# Patient Record
Sex: FEMALE | Race: WHITE | NOT HISPANIC OR LATINO | Employment: OTHER | URBAN - METROPOLITAN AREA
[De-identification: names, ages, dates, MRNs, and addresses within clinical notes are randomized per-mention and may not be internally consistent; named-entity substitution may affect disease eponyms.]

---

## 2018-11-22 ENCOUNTER — HOSPITAL ENCOUNTER (EMERGENCY)
Facility: HOSPITAL | Age: 83
Discharge: HOME | End: 2018-11-22
Attending: EMERGENCY MEDICINE | Admitting: EMERGENCY MEDICINE
Payer: MEDICARE

## 2018-11-22 ENCOUNTER — APPOINTMENT (EMERGENCY)
Dept: RADIOLOGY | Facility: HOSPITAL | Age: 83
End: 2018-11-22
Payer: MEDICARE

## 2018-11-22 ENCOUNTER — APPOINTMENT (EMERGENCY)
Dept: CT IMAGING | Facility: HOSPITAL | Age: 83
End: 2018-11-22
Payer: MEDICARE

## 2018-11-22 VITALS
BODY MASS INDEX: 25.71 KG/M2 | HEART RATE: 62 BPM | WEIGHT: 160 LBS | TEMPERATURE: 97.6 F | RESPIRATION RATE: 18 BRPM | SYSTOLIC BLOOD PRESSURE: 104 MMHG | HEIGHT: 66 IN | OXYGEN SATURATION: 98 % | DIASTOLIC BLOOD PRESSURE: 64 MMHG

## 2018-11-22 DIAGNOSIS — S70.01XA CONTUSION OF RIGHT HIP, INITIAL ENCOUNTER: ICD-10-CM
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
## 2018-11-22 LAB
ALBUMIN SERPL BCP-MCNC: 4.1 G/DL (ref 3.5–5.7)
ALP SERPL-CCNC: 96 U/L (ref 55–165)
ALT SERPL W P-5'-P-CCNC: 12 U/L (ref 7–52)
ANION GAP SERPL CALCULATED.3IONS-SCNC: 6 MMOL/L (ref 4–13)
APTT PPP: 36 SECONDS (ref 26–38)
AST SERPL W P-5'-P-CCNC: 17 U/L (ref 13–39)
ATRIAL RATE: 64 BPM
BASOPHILS # BLD AUTO: 0 THOUSANDS/ΜL (ref 0–0.1)
BASOPHILS NFR BLD AUTO: 0 % (ref 0–1)
BILIRUB SERPL-MCNC: 0.4 MG/DL (ref 0.2–1)
BUN SERPL-MCNC: 18 MG/DL (ref 7–25)
CALCIUM SERPL-MCNC: 9.6 MG/DL (ref 8.6–10.5)
CHLORIDE SERPL-SCNC: 99 MMOL/L (ref 98–107)
CO2 SERPL-SCNC: 32 MMOL/L (ref 21–31)
CREAT SERPL-MCNC: 0.82 MG/DL (ref 0.6–1.2)
EOSINOPHIL # BLD AUTO: 0.1 THOUSAND/ΜL (ref 0–0.61)
EOSINOPHIL NFR BLD AUTO: 2 % (ref 0–6)
ERYTHROCYTE [DISTWIDTH] IN BLOOD BY AUTOMATED COUNT: 13.2 % (ref 11.6–15.1)
GFR SERPL CREATININE-BSD FRML MDRD: 65 ML/MIN/1.73SQ M
GLUCOSE SERPL-MCNC: 98 MG/DL (ref 65–140)
HCT VFR BLD AUTO: 38 % (ref 37–47)
HGB BLD-MCNC: 12.7 G/DL (ref 11.5–15.4)
HOLD SPECIMEN: NORMAL
INR PPP: 1.11 (ref 0.9–1.5)
LYMPHOCYTES # BLD AUTO: 2 THOUSANDS/ΜL (ref 0.6–4.47)
LYMPHOCYTES NFR BLD AUTO: 25 % (ref 14–44)
MCH RBC QN AUTO: 32.2 PG (ref 26.8–34.3)
MCHC RBC AUTO-ENTMCNC: 33.3 G/DL (ref 31.4–37.4)
MCV RBC AUTO: 97 FL (ref 82–98)
MONOCYTES # BLD AUTO: 0.7 THOUSAND/ΜL (ref 0.17–1.22)
MONOCYTES NFR BLD AUTO: 8 % (ref 4–12)
NEUTROPHILS # BLD AUTO: 5.3 THOUSANDS/ΜL (ref 1.85–7.62)
NEUTS SEG NFR BLD AUTO: 65 % (ref 43–75)
NRBC BLD AUTO-RTO: 0 /100 WBCS
P AXIS: -24 DEGREES
PLATELET # BLD AUTO: 193 THOUSANDS/UL (ref 149–390)
PMV BLD AUTO: 8 FL (ref 8.9–12.7)
POTASSIUM SERPL-SCNC: 3.9 MMOL/L (ref 3.5–5.5)
PR INTERVAL: 228 MS
PROT SERPL-MCNC: 6.7 G/DL (ref 6.4–8.9)
PROTHROMBIN TIME: 12.8 SECONDS (ref 10.2–13)
QRS AXIS: -25 DEGREES
QRSD INTERVAL: 86 MS
QT INTERVAL: 440 MS
QTC INTERVAL: 453 MS
RBC # BLD AUTO: 3.94 MILLION/UL (ref 3.81–5.12)
SODIUM SERPL-SCNC: 137 MMOL/L (ref 134–143)
T WAVE AXIS: 21 DEGREES
VENTRICULAR RATE: 64 BPM
WBC # BLD AUTO: 8.2 THOUSAND/UL (ref 4.31–10.16)

## 2018-11-22 PROCEDURE — 85610 PROTHROMBIN TIME: CPT | Performed by: EMERGENCY MEDICINE

## 2018-11-22 PROCEDURE — 99284 EMERGENCY DEPT VISIT MOD MDM: CPT

## 2018-11-22 PROCEDURE — 72125 CT NECK SPINE W/O DYE: CPT

## 2018-11-22 PROCEDURE — 80053 COMPREHEN METABOLIC PANEL: CPT | Performed by: EMERGENCY MEDICINE

## 2018-11-22 PROCEDURE — 85730 THROMBOPLASTIN TIME PARTIAL: CPT | Performed by: EMERGENCY MEDICINE

## 2018-11-22 PROCEDURE — 70450 CT HEAD/BRAIN W/O DYE: CPT

## 2018-11-22 PROCEDURE — 73552 X-RAY EXAM OF FEMUR 2/>: CPT

## 2018-11-22 PROCEDURE — 73521 X-RAY EXAM HIPS BI 2 VIEWS: CPT

## 2018-11-22 PROCEDURE — 93005 ELECTROCARDIOGRAM TRACING: CPT

## 2018-11-22 PROCEDURE — 85025 COMPLETE CBC W/AUTO DIFF WBC: CPT | Performed by: EMERGENCY MEDICINE

## 2018-11-22 PROCEDURE — 36415 COLL VENOUS BLD VENIPUNCTURE: CPT | Performed by: EMERGENCY MEDICINE

## 2018-11-22 PROCEDURE — 93010 ELECTROCARDIOGRAM REPORT: CPT | Performed by: INTERNAL MEDICINE

## 2018-11-22 RX ORDER — LANOLIN ALCOHOL/MO/W.PET/CERES
CREAM (GRAM) TOPICAL DAILY
COMMUNITY
End: 2019-11-25 | Stop reason: HOSPADM

## 2018-11-22 RX ORDER — FERROUS SULFATE 325(65) MG
325 TABLET ORAL 2 TIMES DAILY WITH MEALS
Status: ON HOLD | COMMUNITY
End: 2019-06-01 | Stop reason: SDUPTHER

## 2018-11-22 RX ORDER — SPIRONOLACTONE 25 MG/1
25 TABLET ORAL DAILY
COMMUNITY
End: 2019-06-03 | Stop reason: HOSPADM

## 2018-11-22 RX ORDER — MELATONIN
50000 DAILY
COMMUNITY
End: 2019-06-03 | Stop reason: HOSPADM

## 2018-11-22 RX ORDER — BUMETANIDE 0.5 MG/1
0.5 TABLET ORAL 2 TIMES DAILY
COMMUNITY
End: 2019-06-03 | Stop reason: HOSPADM

## 2018-11-22 NOTE — ED PROVIDER NOTES
History  Chief Complaint   Patient presents with    Fall     According to EMS, the patient had fallen landing on the carpet with an abrasion to right elbow and offered c/o right hip pain  No LOC report  55-year-old female history of dementia, anemia, CAD, COPD, diverticulosis and Parkinson's disease presents for evaluation status post fall  Per EMS and nursing facility patient with unwitnessed fall just prior to ED arrival   Patient complained of right hip pain per nursing home record however EMS reports patient with complaint of left hip pain on their arrival   Patient is a very poor historian and is providing very little history  Patient currently reports no symptoms  Patient does report hitting head upon falling  History provided by:  Patient and EMS personnel  History limited by:  Dementia   used: No        Prior to Admission Medications   Prescriptions Last Dose Informant Patient Reported? Taking? bumetanide (BUMEX) 0 5 MG tablet   Yes Yes   Sig: Take 0 5 mg by mouth daily   carbidopa-levodopa (SINEMET)  mg per tablet   Yes Yes   Sig: Take 1 tablet by mouth 3 (three) times a day   cholecalciferol (VITAMIN D3) 1,000 units tablet   Yes Yes   Sig: Take 50,000 Units by mouth daily   cyanocobalamin (VITAMIN B-12) 1,000 mcg tablet   Yes Yes   Sig: Take by mouth daily   ferrous sulfate 325 (65 Fe) mg tablet   Yes Yes   Sig: Take 325 mg by mouth daily with breakfast   spironolactone (ALDACTONE) 25 mg tablet   Yes Yes   Sig: Take 25 mg by mouth daily      Facility-Administered Medications: None       Past Medical History:   Diagnosis Date    Anemia     CAD (coronary artery disease)     [facility]     Dementia     Diverticulosis     Edema     Gout     Parkinson disease ([facility] [address] )        History reviewed  No pertinent surgical history  History reviewed  No pertinent family history    I have reviewed and agree with the history as documented  Social History   Substance Use Topics    Smoking status: Not on file    Smokeless tobacco: Never Used    Alcohol use No        Review of Systems   Constitutional: Negative for chills and fever  HENT: Negative for rhinorrhea and sore throat  Eyes: Negative for visual disturbance  Respiratory: Negative for cough and shortness of breath  Cardiovascular: Negative for chest pain and leg swelling  Gastrointestinal: Negative for abdominal pain, diarrhea, nausea and vomiting  Genitourinary: Negative for dysuria  Musculoskeletal: Negative for back pain and myalgias  Hip pain   Skin: Negative for rash  Neurological: Negative for dizziness and headaches  Psychiatric/Behavioral: Negative for confusion  All other systems reviewed and are negative  Physical Exam  Physical Exam   Constitutional: She appears well-developed and well-nourished  HENT:   Nose: Nose normal    Mouth/Throat: Oropharynx is clear and moist  No oropharyngeal exudate  Eyes: Pupils are equal, round, and reactive to light  Conjunctivae and EOM are normal  No scleral icterus  Neck: Normal range of motion  Neck supple  No JVD present  No tracheal deviation present  Cardiovascular: Normal rate, regular rhythm and normal heart sounds  No murmur heard  Pulmonary/Chest: Effort normal and breath sounds normal  No respiratory distress  She has no wheezes  She has no rales  Abdominal: Soft  Bowel sounds are normal  There is no tenderness  There is no guarding  Musculoskeletal: She exhibits no edema or tenderness  Very mild shortening and external rotation of the right lower extremity   Neurological: She is alert  No cranial nerve deficit or sensory deficit  She exhibits normal muscle tone  nl sens   Skin: Skin is warm and dry  Psychiatric: She has a normal mood and affect  Her behavior is normal    Nursing note and vitals reviewed        Vital Signs  ED Triage Vitals [11/22/18 0659]   Temperature Pulse Respirations Blood Pressure SpO2   97 5 °F (36 4 °C) 62 18 104/50 97 %      Temp Source Heart Rate Source Patient Position - Orthostatic VS BP Location FiO2 (%)   Tympanic Monitor Lying Right arm --      Pain Score       --           Vitals:    11/22/18 0659 11/22/18 0943   BP: 104/50 104/64   Pulse: 62 62   Patient Position - Orthostatic VS: Lying Lying       Visual Acuity      ED Medications  Medications - No data to display    Diagnostic Studies  Results Reviewed     Procedure Component Value Units Date/Time    Comprehensive metabolic panel [[id number]]  (Abnormal) Collected:  11/22/18 0721    Lab Status:  Final result Specimen:  Blood from Arm, Left Updated:  11/22/18 0758     Sodium 137 mmol/L      Potassium 3 9 mmol/L      Chloride 99 mmol/L      CO2 32 (H) mmol/L      ANION GAP 6 mmol/L      BUN 18 mg/dL      Creatinine 0 82 mg/dL      Glucose 98 mg/dL      Calcium 9 6 mg/dL      AST 17 U/L      ALT 12 U/L      Alkaline Phosphatase 96 U/L      Total Protein 6 7 g/dL      Albumin 4 1 g/dL      Total Bilirubin 0 40 mg/dL      eGFR 65 ml/min/1 73sq m     Narrative:         National Kidney Disease Education Program recommendations are as follows:  GFR calculation is accurate only with a steady state creatinine  Chronic Kidney disease less than 60 ml/min/1 73 sq  meters  Kidney failure less than 15 ml/min/1 73 sq  meters      Michael Reynolds [[id number]]  (Normal) Collected:  11/22/18 0721    Lab Status:  Final result Specimen:  Blood from Arm, Left Updated:  11/22/18 0751     Protime 12 8 seconds      INR 1 11    APTT [[id number]]  (Normal) Collected:  11/22/18 0721    Lab Status:  Final result Specimen:  Blood from Arm, Left Updated:  11/22/18 0751     PTT 36 seconds     CBC and differential [[id number]]  (Abnormal) Collected:  11/22/18 0721    Lab Status:  Final result Specimen:  Blood from Arm, Left Updated:  11/22/18 0739     WBC 8 20 Thousand/uL      RBC 3 94 Million/uL      Hemoglobin 12 7 g/dL      Hematocrit 38 0 %      MCV 97 fL      MCH 32 2 pg      MCHC 33 3 g/dL      RDW 13 2 %      MPV 8 0 (L) fL      Platelets 504 Thousands/uL      nRBC 0 /100 WBCs      Neutrophils Relative 65 %      Lymphocytes Relative 25 %      Monocytes Relative 8 %      Eosinophils Relative 2 %      Basophils Relative 0 %      Neutrophils Absolute 5 30 Thousands/µL      Lymphocytes Absolute 2 00 Thousands/µL      Monocytes Absolute 0 70 Thousand/µL      Eosinophils Absolute 0 10 Thousand/µL      Basophils Absolute 0 00 Thousands/µL     [place] draw [[id number]] Collected:  11/22/18 0723    Lab Status:  Final result Specimen:  Blood from Arm, Left Updated:  11/22/18 3798    Narrative: The following orders were created for panel order [place] draw  Procedure                               Abnormality         Status                     ---------                               -----------         ------                     Kristen Busch / Yellow tube on SVNP[[id number]]                      Final result                 Please view results for these tests on the individual orders  XR femur 2 views LEFT   Final Result by Cheryle Duverney (11/22 0829)   No evidence for acute injury  The right hip arthroplasty is intact  Signed by Cheryle Duverney      XR femur 2 views RIGHT   Final Result by Cheryle Duverney (11/22 0829)   No evidence for acute injury  The right hip arthroplasty is intact  Signed by Cheryle Duverney      CT head without contrast   Final Result by Cheryle Duverney (11/22 0827)   Unremarkable head CT  Signed by Cheryle Duverney      CT spine cervical without contrast   Final Result by Cheryle Duverney (11/22 0827)   Unremarkable head CT  Signed by Cheryle Duverney      XR hips bilateral 2 vw w pelvis if performed   Final Result by Khris Stokes (11/22 0826)   Left hip: No acute fracture or malalignment  Degenerative changes  Right hip: Intact right total hip arthroplasty    Chronic deformity at the   greater trochanter  Signed by Adriel Tee DO                 Procedures  Procedures       Phone Contacts  ED Phone Contact    ED Course  ED Course as of Nov 22 1041   u Nov 22, 2018   0703 Patient seen examined at bedside  Labs, EKG and imaging ordered  0727 Rate 64 beats per minute normal sinus rhythm no ST segment elevations or depressions normal  1st degree AV block no prior EKG for comparison ECG 12 lead   0833 Labs and imaging reviewed  Labs and no acute abnormalities  Imaging with no evidence of acute fracture dislocation of the no traumatic head or neck injury  200 Patient's daughter at bedside to pick her up discussed results of imaging and plan for discharge back to nursing facility                                MDM  CritCare Time    Disposition  Final diagnoses:   Fall, initial encounter   Contusion of right hip, initial encounter     Time reflects when diagnosis was documented in both MDM as applicable and the Disposition within this note     Time User Action Codes Description Comment    11/22/2018  8:34 AM Kwame Aguilar Add Zaria Alonso  XXXA] Fall, initial encounter     11/22/2018  8:34 AM Prince Bentley Add [[id number]] Contusion of right hip, initial encounter       ED Disposition     ED Disposition Condition Comment    Discharge  Keena Doug discharge to home/self care  Condition at discharge: Stable        Follow-up Information     Follow up With Specialties Details Why [address] Drew Iqbal, DO Internal Medicine In 2 days  [place] [address] [address] Emergency Department Emergency Medicine  As needed, If symptoms worsen [facility]  [phone]          Patient's Medications   Discharge Prescriptions    No medications on file     No discharge procedures on file      ED Provider  Electronically Signed by           Judith Mendes DO  11/22/18 [facility]

## 2018-11-22 NOTE — DISCHARGE INSTRUCTIONS
Hip Contusion   WHAT YOU NEED TO KNOW:   A hip contusion is a bruise that appears on the skin of your hip after an injury  The injury is caused by a fall, being hit with a large object, or kicked  A bruise happens when small blood vessels tear but the skin does not  When blood vessels tear, blood leaks into nearby tissue, such as soft tissue or muscle  DISCHARGE INSTRUCTIONS:   Return to the emergency department if:   · You have severe pain in your hip  · You have numbness in your leg or toes  · You cannot put any weight on or move your hip  Contact your healthcare provider if:   · Your pain does not decrease, even after treatment  · You have questions or concerns about your condition or care  Medicines:   · NSAIDs , such as ibuprofen, help decrease swelling, pain, and fever  This medicine is available with or without a doctor's order  NSAIDs can cause stomach bleeding or kidney problems in certain people  If you take blood thinner medicine, always ask if NSAIDs are safe for you  Always read the medicine label and follow directions  Do not give these medicines to children under 10months of age without direction from your child's healthcare provider  · Take your medicine as directed  Contact your healthcare provider if you think your medicine is not helping or if you have side effects  Tell him of her if you are allergic to any medicine  Keep a list of the medicines, vitamins, and herbs you take  Include the amounts, and when and why you take them  Bring the list or the pill bottles to follow-up visits  Carry your medicine list with you in case of an emergency  Follow up with your healthcare provider as directed: You may need to return within a week to recheck your injury  Write down any questions you have so you remember to ask them during your visits  Self-care:   · Rest  your injured hip so that it can heal  You may need to avoid putting any weight on your hip for at least 48 hours   Return to How Severe Are Your Spot(S)?: mild Have Your Spot(S) Been Treated In The Past?: has not been treated normal activities as directed  · Ice  the injury for 20 minutes every 4 hours, or as directed  Use an ice pack, or put crushed ice in a plastic bag  Cover it with a towel to protect your skin  Ice helps prevent tissue damage and decreases swelling and pain  · Compress  your injury with an elastic bandage to reduce swelling  Ask your healthcare provider how to use an elastic bandage and how tight it should be  · Elevate  your injured hip above the level of your heart as often as you can  This will help decrease swelling and pain  If possible, prop your hip and leg on pillows or blankets to keep it elevated comfortably  Help your hip contusion heal:   · Do not massage or use heat  Heat and massage may slow healing of the area  · Do not stretch injured muscles  Ask your healthcare provider when and how you may safely stretch after your injury  · Do not drink alcohol  Alcohol may slow healing of your injury  Prevent another contusion:   · Stretch and warm up before you play sports or exercise  · Wear protective gear when you play sports  · If you begin a new physical activity, start slowly to give your body a chance to adjust   © 2017 [facility] [facility] Information is for End User's use only and may not be sold, redistributed or otherwise used for commercial purposes  All illustrations and images included in CareNotes® are the copyrighted property of A D A M , Inc  or Khai Tyson  The above information is an  only  It is not intended as medical advice for individual conditions or treatments  Talk to your doctor, nurse or pharmacist before following any medical regimen to see if it is safe and effective for you  Hpi Title: Evaluation of Skin Lesions

## 2019-03-15 ENCOUNTER — APPOINTMENT (EMERGENCY)
Dept: RADIOLOGY | Facility: HOSPITAL | Age: 84
End: 2019-03-15
Payer: MEDICARE

## 2019-03-15 ENCOUNTER — APPOINTMENT (EMERGENCY)
Dept: CT IMAGING | Facility: HOSPITAL | Age: 84
End: 2019-03-15
Payer: MEDICARE

## 2019-03-15 ENCOUNTER — HOSPITAL ENCOUNTER (EMERGENCY)
Facility: HOSPITAL | Age: 84
Discharge: HOME | End: 2019-03-16
Attending: EMERGENCY MEDICINE
Payer: MEDICARE

## 2019-03-15 VITALS
HEART RATE: 76 BPM | RESPIRATION RATE: 16 BRPM | OXYGEN SATURATION: 98 % | TEMPERATURE: 97.1 F | DIASTOLIC BLOOD PRESSURE: 67 MMHG | SYSTOLIC BLOOD PRESSURE: 106 MMHG
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
## 2019-03-15 LAB — GLUCOSE SERPL-MCNC: 93 MG/DL (ref 65–140)

## 2019-03-15 PROCEDURE — 73521 X-RAY EXAM HIPS BI 2 VIEWS: CPT

## 2019-03-15 PROCEDURE — 72125 CT NECK SPINE W/O DYE: CPT

## 2019-03-15 PROCEDURE — 99284 EMERGENCY DEPT VISIT MOD MDM: CPT

## 2019-03-15 PROCEDURE — 70450 CT HEAD/BRAIN W/O DYE: CPT

## 2019-03-15 PROCEDURE — 71045 X-RAY EXAM CHEST 1 VIEW: CPT

## 2019-03-15 PROCEDURE — 82948 REAGENT STRIP/BLOOD GLUCOSE: CPT

## 2019-03-15 RX ORDER — ACETAMINOPHEN 325 MG/1
650 TABLET ORAL EVERY 4 HOURS PRN
COMMUNITY

## 2019-03-15 RX ORDER — LOPERAMIDE HYDROCHLORIDE 2 MG/1
2 CAPSULE ORAL 4 TIMES DAILY PRN
COMMUNITY
End: 2019-11-25 | Stop reason: HOSPADM

## 2019-03-15 RX ORDER — TRIAMCINOLONE ACETONIDE 1 MG/G
CREAM TOPICAL 2 TIMES DAILY
COMMUNITY

## 2019-03-15 RX ORDER — SIMETHICONE 80 MG
80 TABLET,CHEWABLE ORAL EVERY 6 HOURS PRN
COMMUNITY

## 2019-03-15 RX ORDER — NYSTATIN 100000 U/G
CREAM TOPICAL 2 TIMES DAILY
COMMUNITY

## 2019-03-16 NOTE — ED PROVIDER NOTES
History  Chief Complaint   Patient presents with    Fall     fell at [facility] care home onto [address]  denies pain or injury at this time  history of dementia     Patient is an 27-year-old female with history of dementia presents the emergency department from local personal care [place] after a unwitnessed fall patient was found on the ground she denies any acute injuries or complaints there is no evidence of external trauma  Patient does have a history of dementia but is behaving normally and appropriately per family members present with the patient in the ED  History provided by:  Patient and EMS personnel  Fall   Mechanism of injury: fall    Injury location:  Head/neck  Time since incident:  1 hour  Arrived directly from scene: yes    Suspicion of alcohol use: no    Suspicion of drug use: no    Prior to arrival data:     Responsiveness at scene:  Alert    Loss of consciousness: no      Amnesic to event: no    Associated symptoms: no abdominal pain, no chest pain, no headaches, no nausea and no vomiting        Prior to Admission Medications   Prescriptions Last Dose Informant Patient Reported?  Taking?   acetaminophen (TYLENOL) 325 mg tablet   Yes Yes   Sig: Take 650 mg by mouth every 6 (six) hours as needed for mild pain   bumetanide (BUMEX) 0 5 MG tablet   Yes Yes   Sig: Take 0 5 mg by mouth daily   carbidopa-levodopa (SINEMET)  mg per tablet   Yes Yes   Sig: Take 1 tablet by mouth 3 (three) times a day   cholecalciferol (VITAMIN D3) 1,000 units tablet   Yes Yes   Sig: Take 50,000 Units by mouth daily   cyanocobalamin (VITAMIN B-12) 1,000 mcg tablet   Yes Yes   Sig: Take by mouth daily   ferrous sulfate 325 (65 Fe) mg tablet   Yes Yes   Sig: Take 325 mg by mouth daily with breakfast   loperamide (IMODIUM) 2 mg capsule   Yes Yes   Sig: Take 2 mg by mouth 4 (four) times a day as needed for diarrhea   nystatin (MYCOSTATIN) cream   Yes Yes   Sig: Apply topically 2 (two) times a day   simethicone (MYLICON) 80 mg chewable tablet   Yes Yes   Sig: Chew 80 mg every 6 (six) hours as needed for flatulence   spironolactone (ALDACTONE) 25 mg tablet   Yes Yes   Sig: Take 25 mg by mouth daily   triamcinolone (KENALOG) 0 1 % cream   Yes Yes   Sig: Apply topically 2 (two) times a day      Facility-Administered Medications: None       Past Medical History:   Diagnosis Date    Anemia     CAD (coronary artery disease)     COPD (chronic obstructive pulmonary disease) ([place] [address] )     Dementia     Diverticulosis     Edema     Gout     Parkinson disease ([place] [address] )        History reviewed  No pertinent surgical history  History reviewed  No pertinent family history  I have reviewed and agree with the history as documented  Social History     Tobacco Use    Smoking status: Never Smoker    Smokeless tobacco: Never Used   Substance Use Topics    Alcohol use: No    Drug use: No        Review of Systems   Constitutional: Negative for activity change, appetite change, chills, fatigue and fever  HENT: Negative for congestion, ear pain, rhinorrhea and sore throat  Eyes: Negative for discharge, redness and visual disturbance  Respiratory: Negative for cough, chest tightness, shortness of breath and wheezing  Cardiovascular: Negative for chest pain and palpitations  Gastrointestinal: Negative for abdominal pain, constipation, diarrhea, nausea and vomiting  Endocrine: Negative for polydipsia and polyuria  Genitourinary: Negative for difficulty urinating, dysuria, frequency, hematuria and urgency  Musculoskeletal: Negative for arthralgias and myalgias  Skin: Negative for color change, pallor and rash  Neurological: Negative for dizziness, weakness, light-headedness, numbness and headaches  Hematological: Negative for adenopathy  Does not bruise/bleed easily  All other systems reviewed and are negative  Physical Exam  Physical Exam   Constitutional: She appears well-developed and well-nourished  HENT:   Head: Normocephalic and atraumatic  Right Ear: External ear normal    Left Ear: External ear normal    Nose: Nose normal    Mouth/Throat: Oropharynx is clear and moist    Eyes: Pupils are equal, round, and reactive to light  Conjunctivae and EOM are normal    Neck: Normal range of motion  Neck supple  Cardiovascular: Normal rate, regular rhythm, normal heart sounds and intact distal pulses  Pulmonary/Chest: Effort normal and breath sounds normal  No respiratory distress  She has no wheezes  She has no rales  She exhibits no tenderness  Abdominal: Soft  Bowel sounds are normal  She exhibits no distension  There is no tenderness  There is no guarding  Musculoskeletal: Normal range of motion  Neurological: She is alert  No cranial nerve deficit or sensory deficit  Skin: Skin is warm and dry  Psychiatric: She has a normal mood and affect  Nursing note and vitals reviewed        Vital Signs  ED Triage Vitals [03/15/19 2252]   Temperature Pulse Respirations Blood Pressure SpO2   (!) 97 1 °F (36 2 °C) 73 16 113/62 99 %      Temp Source Heart Rate Source Patient Position - Orthostatic VS BP Location FiO2 (%)   Temporal -- Lying Right arm --      Pain Score       No Pain           Vitals:    03/15/19 2252 03/15/19 2300   BP: 113/62 106/67   Pulse: 73 76   Patient Position - Orthostatic VS: Lying        qSOFA     Row Name 03/15/19 2310 03/15/19 2300 03/15/19 2252          Altered mental status GCS < 15  1  --  --      Respiratory Rate > / =22  --  --  0      Systolic BP < / =914  --  0  0      Q Sofa Score  1  0  0            Visual Acuity      ED Medications  Medications - No data to display    Diagnostic Studies  Results Reviewed     Procedure Component Value Units Date/Time    Fingerstick Glucose (POCT) [[id number]]  (Normal) Collected:  03/15/19 2302    Lab Status:  Final result Updated:  03/15/19 2303     POC Glucose 93 mg/dl                  XR hips bilateral with ap pelvis 2 vw   ED Interpretation by Efra Barreto DO (03/16 0020)   No acute fracture or dislocation      XR chest 1 view portable   ED Interpretation by Efra Barreto DO (03/16 0020)   No acute cardiopulmonary abnormality      CT spine cervical without contrast   Final Result by Anahy Hernandez MD (03/16 0031)   Images motion affected limiting evaluation for a nondisplaced  Consider repeat exam with the patient's mental status permits  No gross or displaced cervical spine fracture or traumatic malalignment  Workstation performed: [id number]         CT head without contrast   Final Result by Anahy Hernandez MD (03/16 0025)      No acute intracranial abnormality  Workstation performed: [id number]                    Procedures  Procedures       Phone Contacts  ED Phone Contact    ED Course  ED Course as of Mar 16 0037   Sat Mar 16, 2019   0018 XR hips bilateral with ap pelvis 2 vw                               MDM  Number of Diagnoses or Management Options  Diagnosis management comments: No evidence of acute traumatic injury patient remained clinically and hemodynamically stable in the emergency department without any complaints advised on fall precautions and advised prompt follow-up with primary physician for re-evaluation and obtain test results return precautions and anticipatory guidance discussed  Amount and/or Complexity of Data Reviewed  Tests in the radiology section of CPT®: ordered and reviewed  Independent visualization of images, tracings, or specimens: yes    Risk of Complications, Morbidity, and/or Mortality  Presenting problems: moderate  Management options: moderate    Patient Progress  Patient progress: stable      Disposition  Final diagnoses:   Fall, initial encounter     Time reflects when diagnosis was documented in both MDM as applicable and the Disposition within this note     Time User Action Codes Description Comment    3/16/2019 12:26 AM Aditi Fuller Fall, initial encounter       ED Disposition     ED Disposition Condition Date/Time Comment    Discharge Stable Sat Mar 16, 2019 12:26 AM Skeet [place] discharge to home/self care  Follow-up Information     Follow up With Specialties Details Why Contact Info    Alejandro Ugarte DO Internal Medicine Schedule an appointment as soon as possible for a visit in 3 days  [facility] [address]  [phone]            Patient's Medications   Discharge Prescriptions    No medications on file     No discharge procedures on file      ED Provider  Electronically Signed by           Yin Brady DO  03/16/19 6449

## 2019-05-28 ENCOUNTER — APPOINTMENT (EMERGENCY)
Dept: RADIOLOGY | Facility: HOSPITAL | Age: 84
DRG: 071 | End: 2019-05-28
Payer: MEDICARE

## 2019-05-28 ENCOUNTER — APPOINTMENT (EMERGENCY)
Dept: CT IMAGING | Facility: HOSPITAL | Age: 84
DRG: 071 | End: 2019-05-28
Payer: MEDICARE

## 2019-05-28 ENCOUNTER — HOSPITAL ENCOUNTER (INPATIENT)
Facility: HOSPITAL | Age: 84
LOS: 5 days | Discharge: NURSING, REHAB OR LONG-TERM CARE | DRG: 071 | End: 2019-06-03
Attending: EMERGENCY MEDICINE | Admitting: FAMILY MEDICINE
Payer: MEDICARE

## 2019-05-28 DIAGNOSIS — F03.90 DEMENTIA (HCC): ICD-10-CM

## 2019-05-28 DIAGNOSIS — L03.115 CELLULITIS OF RIGHT LEG: Primary | ICD-10-CM

## 2019-05-28 DIAGNOSIS — I50.9 CHF EXACERBATION (HCC): ICD-10-CM

## 2019-05-28 DIAGNOSIS — L03.115 CELLULITIS OF RIGHT LOWER EXTREMITY: ICD-10-CM

## 2019-05-28 DIAGNOSIS — M79.89 LEG SWELLING: ICD-10-CM

## 2019-05-28 DIAGNOSIS — N30.00 ACUTE CYSTITIS WITHOUT HEMATURIA: ICD-10-CM

## 2019-05-28 DIAGNOSIS — E61.1 IRON DEFICIENCY: ICD-10-CM

## 2019-05-28 PROBLEM — R60.0 BILATERAL LOWER EXTREMITY EDEMA: Status: ACTIVE | Noted: 2019-05-28

## 2019-05-28 LAB
ABO GROUP BLD: NORMAL
ALBUMIN SERPL BCP-MCNC: 3.5 G/DL (ref 3.5–5)
ALP SERPL-CCNC: 144 U/L (ref 46–116)
ALT SERPL W P-5'-P-CCNC: 9 U/L (ref 12–78)
ANION GAP SERPL CALCULATED.3IONS-SCNC: 10 MMOL/L (ref 4–13)
APTT PPP: 32 SECONDS (ref 26–38)
AST SERPL W P-5'-P-CCNC: 20 U/L (ref 5–45)
BACTERIA UR QL AUTO: ABNORMAL /HPF
BASOPHILS # BLD AUTO: 0.03 THOUSANDS/ΜL (ref 0–0.1)
BASOPHILS NFR BLD AUTO: 0 % (ref 0–1)
BILIRUB DIRECT SERPL-MCNC: 0.05 MG/DL (ref 0–0.2)
BILIRUB SERPL-MCNC: 0.3 MG/DL (ref 0.2–1)
BILIRUB UR QL STRIP: NEGATIVE
BLD GP AB SCN SERPL QL: POSITIVE
BLOOD GROUP ANTIBODIES SERPL: NORMAL
BUN SERPL-MCNC: 27 MG/DL (ref 5–25)
CALCIUM SERPL-MCNC: 9.8 MG/DL (ref 8.3–10.1)
CHLORIDE SERPL-SCNC: 99 MMOL/L (ref 100–108)
CLARITY UR: ABNORMAL
CO2 SERPL-SCNC: 31 MMOL/L (ref 21–32)
COLOR UR: YELLOW
CREAT SERPL-MCNC: 0.9 MG/DL (ref 0.6–1.3)
EOSINOPHIL # BLD AUTO: 0.09 THOUSAND/ΜL (ref 0–0.61)
EOSINOPHIL NFR BLD AUTO: 1 % (ref 0–6)
ERYTHROCYTE [DISTWIDTH] IN BLOOD BY AUTOMATED COUNT: 13.4 % (ref 11.6–15.1)
GFR SERPL CREATININE-BSD FRML MDRD: 58 ML/MIN/1.73SQ M
GLUCOSE SERPL-MCNC: 107 MG/DL (ref 65–140)
GLUCOSE UR STRIP-MCNC: NEGATIVE MG/DL
HCT VFR BLD AUTO: 33.7 % (ref 34.8–46.1)
HGB BLD-MCNC: 10.8 G/DL (ref 11.5–15.4)
HGB UR QL STRIP.AUTO: NEGATIVE
IMM GRANULOCYTES # BLD AUTO: 0.04 THOUSAND/UL (ref 0–0.2)
IMM GRANULOCYTES NFR BLD AUTO: 0 % (ref 0–2)
INR PPP: 1.05 (ref 0.86–1.17)
KETONES UR STRIP-MCNC: NEGATIVE MG/DL
LACTATE SERPL-SCNC: 0.7 MMOL/L (ref 0.5–2)
LEUKOCYTE ESTERASE UR QL STRIP: ABNORMAL
LYMPHOCYTES # BLD AUTO: 2.17 THOUSANDS/ΜL (ref 0.6–4.47)
LYMPHOCYTES NFR BLD AUTO: 19 % (ref 14–44)
MCH RBC QN AUTO: 31 PG (ref 26.8–34.3)
MCHC RBC AUTO-ENTMCNC: 32 G/DL (ref 31.4–37.4)
MCV RBC AUTO: 97 FL (ref 82–98)
MONOCYTES # BLD AUTO: 0.85 THOUSAND/ΜL (ref 0.17–1.22)
MONOCYTES NFR BLD AUTO: 8 % (ref 4–12)
NEUTROPHILS # BLD AUTO: 8.21 THOUSANDS/ΜL (ref 1.85–7.62)
NEUTS SEG NFR BLD AUTO: 72 % (ref 43–75)
NITRITE UR QL STRIP: POSITIVE
NON-SQ EPI CELLS URNS QL MICRO: ABNORMAL /HPF
NRBC BLD AUTO-RTO: 0 /100 WBCS
NT-PROBNP SERPL-MCNC: 215 PG/ML
PH UR STRIP.AUTO: 5.5 [PH]
PLATELET # BLD AUTO: 250 THOUSANDS/UL (ref 149–390)
PLATELET # BLD AUTO: 270 THOUSANDS/UL (ref 149–390)
PMV BLD AUTO: 9.5 FL (ref 8.9–12.7)
PMV BLD AUTO: 9.7 FL (ref 8.9–12.7)
POTASSIUM SERPL-SCNC: 4.2 MMOL/L (ref 3.5–5.3)
PROT SERPL-MCNC: 7.8 G/DL (ref 6.4–8.2)
PROT UR STRIP-MCNC: NEGATIVE MG/DL
PROTHROMBIN TIME: 13.6 SECONDS (ref 11.8–14.2)
RBC # BLD AUTO: 3.48 MILLION/UL (ref 3.81–5.12)
RBC #/AREA URNS AUTO: ABNORMAL /HPF
RH BLD: POSITIVE
SODIUM SERPL-SCNC: 140 MMOL/L (ref 136–145)
SP GR UR STRIP.AUTO: 1.01 (ref 1–1.03)
SPECIMEN EXPIRATION DATE: NORMAL
TROPONIN I SERPL-MCNC: <0.02 NG/ML
TSH SERPL DL<=0.05 MIU/L-ACNC: 1.75 UIU/ML (ref 0.36–3.74)
UROBILINOGEN UR QL STRIP.AUTO: 0.2 E.U./DL
WBC # BLD AUTO: 11.39 THOUSAND/UL (ref 4.31–10.16)
WBC #/AREA URNS AUTO: ABNORMAL /HPF

## 2019-05-28 PROCEDURE — 87077 CULTURE AEROBIC IDENTIFY: CPT | Performed by: INTERNAL MEDICINE

## 2019-05-28 PROCEDURE — 84443 ASSAY THYROID STIM HORMONE: CPT | Performed by: EMERGENCY MEDICINE

## 2019-05-28 PROCEDURE — 99285 EMERGENCY DEPT VISIT HI MDM: CPT

## 2019-05-28 PROCEDURE — 1124F ACP DISCUSS-NO DSCNMKR DOCD: CPT | Performed by: INTERNAL MEDICINE

## 2019-05-28 PROCEDURE — 80048 BASIC METABOLIC PNL TOTAL CA: CPT | Performed by: EMERGENCY MEDICINE

## 2019-05-28 PROCEDURE — 71046 X-RAY EXAM CHEST 2 VIEWS: CPT

## 2019-05-28 PROCEDURE — 86900 BLOOD TYPING SEROLOGIC ABO: CPT | Performed by: EMERGENCY MEDICINE

## 2019-05-28 PROCEDURE — 86850 RBC ANTIBODY SCREEN: CPT | Performed by: EMERGENCY MEDICINE

## 2019-05-28 PROCEDURE — 87186 SC STD MICRODIL/AGAR DIL: CPT | Performed by: INTERNAL MEDICINE

## 2019-05-28 PROCEDURE — 99219 PR INITIAL OBSERVATION CARE/DAY 50 MINUTES: CPT | Performed by: INTERNAL MEDICINE

## 2019-05-28 PROCEDURE — 86870 RBC ANTIBODY IDENTIFICATION: CPT | Performed by: EMERGENCY MEDICINE

## 2019-05-28 PROCEDURE — 36415 COLL VENOUS BLD VENIPUNCTURE: CPT | Performed by: EMERGENCY MEDICINE

## 2019-05-28 PROCEDURE — 80076 HEPATIC FUNCTION PANEL: CPT | Performed by: EMERGENCY MEDICINE

## 2019-05-28 PROCEDURE — 85610 PROTHROMBIN TIME: CPT | Performed by: EMERGENCY MEDICINE

## 2019-05-28 PROCEDURE — 87086 URINE CULTURE/COLONY COUNT: CPT | Performed by: INTERNAL MEDICINE

## 2019-05-28 PROCEDURE — 85730 THROMBOPLASTIN TIME PARTIAL: CPT | Performed by: EMERGENCY MEDICINE

## 2019-05-28 PROCEDURE — 84484 ASSAY OF TROPONIN QUANT: CPT | Performed by: EMERGENCY MEDICINE

## 2019-05-28 PROCEDURE — 70450 CT HEAD/BRAIN W/O DYE: CPT

## 2019-05-28 PROCEDURE — 85049 AUTOMATED PLATELET COUNT: CPT | Performed by: INTERNAL MEDICINE

## 2019-05-28 PROCEDURE — 83880 ASSAY OF NATRIURETIC PEPTIDE: CPT | Performed by: EMERGENCY MEDICINE

## 2019-05-28 PROCEDURE — 83605 ASSAY OF LACTIC ACID: CPT | Performed by: EMERGENCY MEDICINE

## 2019-05-28 PROCEDURE — 86901 BLOOD TYPING SEROLOGIC RH(D): CPT | Performed by: EMERGENCY MEDICINE

## 2019-05-28 PROCEDURE — 87040 BLOOD CULTURE FOR BACTERIA: CPT | Performed by: EMERGENCY MEDICINE

## 2019-05-28 PROCEDURE — 81001 URINALYSIS AUTO W/SCOPE: CPT | Performed by: INTERNAL MEDICINE

## 2019-05-28 PROCEDURE — 93005 ELECTROCARDIOGRAM TRACING: CPT

## 2019-05-28 PROCEDURE — 99284 EMERGENCY DEPT VISIT MOD MDM: CPT | Performed by: EMERGENCY MEDICINE

## 2019-05-28 PROCEDURE — 85025 COMPLETE CBC W/AUTO DIFF WBC: CPT | Performed by: EMERGENCY MEDICINE

## 2019-05-28 RX ORDER — ACETAMINOPHEN 500 MG
500 TABLET ORAL 2 TIMES DAILY
COMMUNITY
End: 2019-06-03 | Stop reason: HOSPADM

## 2019-05-28 RX ORDER — BUMETANIDE 0.25 MG/ML
1 INJECTION, SOLUTION INTRAMUSCULAR; INTRAVENOUS 2 TIMES DAILY
Status: DISCONTINUED | OUTPATIENT
Start: 2019-05-28 | End: 2019-05-30

## 2019-05-28 RX ORDER — CEFAZOLIN SODIUM 2 G/50ML
2000 SOLUTION INTRAVENOUS ONCE
Status: DISCONTINUED | OUTPATIENT
Start: 2019-05-28 | End: 2019-05-28 | Stop reason: SDUPTHER

## 2019-05-28 RX ORDER — CEFAZOLIN SODIUM 2 G/50ML
2000 SOLUTION INTRAVENOUS EVERY 8 HOURS
Status: DISCONTINUED | OUTPATIENT
Start: 2019-05-28 | End: 2019-05-31

## 2019-05-28 RX ORDER — HEPARIN SODIUM 5000 [USP'U]/ML
5000 INJECTION, SOLUTION INTRAVENOUS; SUBCUTANEOUS EVERY 8 HOURS SCHEDULED
Status: DISCONTINUED | OUTPATIENT
Start: 2019-05-28 | End: 2019-06-03 | Stop reason: HOSPADM

## 2019-05-28 RX ORDER — FUROSEMIDE 10 MG/ML
20 INJECTION INTRAMUSCULAR; INTRAVENOUS ONCE
Status: COMPLETED | OUTPATIENT
Start: 2019-05-28 | End: 2019-05-28

## 2019-05-28 RX ORDER — SIMETHICONE 80 MG
80 TABLET,CHEWABLE ORAL EVERY 6 HOURS PRN
Status: DISCONTINUED | OUTPATIENT
Start: 2019-05-28 | End: 2019-06-03 | Stop reason: HOSPADM

## 2019-05-28 RX ORDER — ERGOCALCIFEROL 1.25 MG/1
50000 CAPSULE ORAL WEEKLY
COMMUNITY
Start: 2019-05-29 | End: 2019-11-25 | Stop reason: HOSPADM

## 2019-05-28 RX ORDER — SPIRONOLACTONE 25 MG/1
25 TABLET ORAL DAILY
Status: DISCONTINUED | OUTPATIENT
Start: 2019-05-29 | End: 2019-05-31

## 2019-05-28 RX ORDER — ACETAMINOPHEN 325 MG/1
650 TABLET ORAL EVERY 6 HOURS PRN
Status: DISCONTINUED | OUTPATIENT
Start: 2019-05-28 | End: 2019-06-03 | Stop reason: HOSPADM

## 2019-05-28 RX ADMIN — CEFAZOLIN SODIUM 2000 MG: 2 SOLUTION INTRAVENOUS at 21:41

## 2019-05-28 RX ADMIN — FUROSEMIDE 20 MG: 10 INJECTION, SOLUTION INTRAMUSCULAR; INTRAVENOUS at 21:41

## 2019-05-28 RX ADMIN — HEPARIN SODIUM 5000 UNITS: 5000 INJECTION INTRAVENOUS; SUBCUTANEOUS at 22:44

## 2019-05-28 RX ADMIN — CARBIDOPA AND LEVODOPA 1 TABLET: 25; 100 TABLET ORAL at 22:44

## 2019-05-29 ENCOUNTER — APPOINTMENT (OUTPATIENT)
Dept: NON INVASIVE DIAGNOSTICS | Facility: HOSPITAL | Age: 84
DRG: 071 | End: 2019-05-29
Payer: MEDICARE

## 2019-05-29 ENCOUNTER — APPOINTMENT (INPATIENT)
Dept: ULTRASOUND IMAGING | Facility: HOSPITAL | Age: 84
DRG: 071 | End: 2019-05-29
Payer: MEDICARE

## 2019-05-29 PROBLEM — F03.90 DEMENTIA ARISING IN THE SENIUM AND PRESENIUM (HCC): Status: ACTIVE | Noted: 2019-05-29

## 2019-05-29 LAB
ANION GAP SERPL CALCULATED.3IONS-SCNC: 10 MMOL/L (ref 4–13)
BASOPHILS # BLD AUTO: 0.02 THOUSANDS/ΜL (ref 0–0.1)
BASOPHILS NFR BLD AUTO: 0 % (ref 0–1)
BUN SERPL-MCNC: 22 MG/DL (ref 5–25)
CALCIUM SERPL-MCNC: 9.3 MG/DL (ref 8.3–10.1)
CHLORIDE SERPL-SCNC: 105 MMOL/L (ref 100–108)
CO2 SERPL-SCNC: 29 MMOL/L (ref 21–32)
CREAT SERPL-MCNC: 0.83 MG/DL (ref 0.6–1.3)
EOSINOPHIL # BLD AUTO: 0.09 THOUSAND/ΜL (ref 0–0.61)
EOSINOPHIL NFR BLD AUTO: 1 % (ref 0–6)
ERYTHROCYTE [DISTWIDTH] IN BLOOD BY AUTOMATED COUNT: 13.3 % (ref 11.6–15.1)
GFR SERPL CREATININE-BSD FRML MDRD: 64 ML/MIN/1.73SQ M
GLUCOSE P FAST SERPL-MCNC: 91 MG/DL (ref 65–99)
GLUCOSE SERPL-MCNC: 91 MG/DL (ref 65–140)
HCT VFR BLD AUTO: 31.7 % (ref 34.8–46.1)
HGB BLD-MCNC: 10.2 G/DL (ref 11.5–15.4)
IMM GRANULOCYTES # BLD AUTO: 0.03 THOUSAND/UL (ref 0–0.2)
IMM GRANULOCYTES NFR BLD AUTO: 0 % (ref 0–2)
LYMPHOCYTES # BLD AUTO: 2.37 THOUSANDS/ΜL (ref 0.6–4.47)
LYMPHOCYTES NFR BLD AUTO: 26 % (ref 14–44)
MAGNESIUM SERPL-MCNC: 2.2 MG/DL (ref 1.6–2.6)
MCH RBC QN AUTO: 30.8 PG (ref 26.8–34.3)
MCHC RBC AUTO-ENTMCNC: 32.2 G/DL (ref 31.4–37.4)
MCV RBC AUTO: 96 FL (ref 82–98)
MONOCYTES # BLD AUTO: 0.75 THOUSAND/ΜL (ref 0.17–1.22)
MONOCYTES NFR BLD AUTO: 8 % (ref 4–12)
NEUTROPHILS # BLD AUTO: 5.8 THOUSANDS/ΜL (ref 1.85–7.62)
NEUTS SEG NFR BLD AUTO: 65 % (ref 43–75)
NRBC BLD AUTO-RTO: 0 /100 WBCS
PLATELET # BLD AUTO: 240 THOUSANDS/UL (ref 149–390)
PMV BLD AUTO: 9.4 FL (ref 8.9–12.7)
POTASSIUM SERPL-SCNC: 3.6 MMOL/L (ref 3.5–5.3)
RBC # BLD AUTO: 3.31 MILLION/UL (ref 3.81–5.12)
SODIUM SERPL-SCNC: 144 MMOL/L (ref 136–145)
VIT B12 SERPL-MCNC: 860 PG/ML (ref 100–900)
WBC # BLD AUTO: 9.06 THOUSAND/UL (ref 4.31–10.16)

## 2019-05-29 PROCEDURE — G8988 SELF CARE GOAL STATUS: HCPCS

## 2019-05-29 PROCEDURE — 99222 1ST HOSP IP/OBS MODERATE 55: CPT | Performed by: PSYCHIATRY & NEUROLOGY

## 2019-05-29 PROCEDURE — 85025 COMPLETE CBC W/AUTO DIFF WBC: CPT | Performed by: INTERNAL MEDICINE

## 2019-05-29 PROCEDURE — 93306 TTE W/DOPPLER COMPLETE: CPT | Performed by: INTERNAL MEDICINE

## 2019-05-29 PROCEDURE — 83735 ASSAY OF MAGNESIUM: CPT | Performed by: INTERNAL MEDICINE

## 2019-05-29 PROCEDURE — 93306 TTE W/DOPPLER COMPLETE: CPT

## 2019-05-29 PROCEDURE — G8979 MOBILITY GOAL STATUS: HCPCS

## 2019-05-29 PROCEDURE — 82607 VITAMIN B-12: CPT | Performed by: FAMILY MEDICINE

## 2019-05-29 PROCEDURE — 99222 1ST HOSP IP/OBS MODERATE 55: CPT | Performed by: INTERNAL MEDICINE

## 2019-05-29 PROCEDURE — G8987 SELF CARE CURRENT STATUS: HCPCS

## 2019-05-29 PROCEDURE — 93971 EXTREMITY STUDY: CPT | Performed by: SURGERY

## 2019-05-29 PROCEDURE — 93971 EXTREMITY STUDY: CPT

## 2019-05-29 PROCEDURE — G8978 MOBILITY CURRENT STATUS: HCPCS

## 2019-05-29 PROCEDURE — 80048 BASIC METABOLIC PNL TOTAL CA: CPT | Performed by: INTERNAL MEDICINE

## 2019-05-29 PROCEDURE — 99232 SBSQ HOSP IP/OBS MODERATE 35: CPT | Performed by: FAMILY MEDICINE

## 2019-05-29 PROCEDURE — 97167 OT EVAL HIGH COMPLEX 60 MIN: CPT

## 2019-05-29 PROCEDURE — 97163 PT EVAL HIGH COMPLEX 45 MIN: CPT

## 2019-05-29 RX ORDER — OLANZAPINE 10 MG/1
2.5 INJECTION, POWDER, LYOPHILIZED, FOR SOLUTION INTRAMUSCULAR EVERY 8 HOURS PRN
Status: DISCONTINUED | OUTPATIENT
Start: 2019-05-29 | End: 2019-06-03 | Stop reason: HOSPADM

## 2019-05-29 RX ADMIN — SPIRONOLACTONE 25 MG: 25 TABLET ORAL at 09:37

## 2019-05-29 RX ADMIN — CARBIDOPA AND LEVODOPA 1 TABLET: 25; 100 TABLET ORAL at 16:36

## 2019-05-29 RX ADMIN — BUMETANIDE 1 MG: 0.25 INJECTION INTRAMUSCULAR; INTRAVENOUS at 09:37

## 2019-05-29 RX ADMIN — CARBIDOPA AND LEVODOPA 1 TABLET: 25; 100 TABLET ORAL at 09:37

## 2019-05-29 RX ADMIN — CEFAZOLIN SODIUM 2000 MG: 2 SOLUTION INTRAVENOUS at 04:42

## 2019-05-29 RX ADMIN — HEPARIN SODIUM 5000 UNITS: 5000 INJECTION INTRAVENOUS; SUBCUTANEOUS at 05:40

## 2019-05-29 RX ADMIN — CARBIDOPA AND LEVODOPA 1 TABLET: 25; 100 TABLET ORAL at 21:09

## 2019-05-29 RX ADMIN — HEPARIN SODIUM 5000 UNITS: 5000 INJECTION INTRAVENOUS; SUBCUTANEOUS at 13:39

## 2019-05-29 RX ADMIN — CEFAZOLIN SODIUM 2000 MG: 2 SOLUTION INTRAVENOUS at 12:37

## 2019-05-29 RX ADMIN — HEPARIN SODIUM 5000 UNITS: 5000 INJECTION INTRAVENOUS; SUBCUTANEOUS at 21:10

## 2019-05-29 RX ADMIN — CEFAZOLIN SODIUM 2000 MG: 2 SOLUTION INTRAVENOUS at 21:10

## 2019-05-30 PROBLEM — I50.30 DIASTOLIC CONGESTIVE HEART FAILURE (HCC): Status: ACTIVE | Noted: 2019-05-30

## 2019-05-30 PROBLEM — N30.00 ACUTE CYSTITIS: Status: ACTIVE | Noted: 2019-05-30

## 2019-05-30 LAB
ANION GAP SERPL CALCULATED.3IONS-SCNC: 8 MMOL/L (ref 4–13)
ATRIAL RATE: 69 BPM
ATRIAL RATE: 83 BPM
BASOPHILS # BLD AUTO: 0.02 THOUSANDS/ΜL (ref 0–0.1)
BASOPHILS NFR BLD AUTO: 0 % (ref 0–1)
BUN SERPL-MCNC: 17 MG/DL (ref 5–25)
CALCIUM SERPL-MCNC: 9.2 MG/DL (ref 8.3–10.1)
CHLORIDE SERPL-SCNC: 102 MMOL/L (ref 100–108)
CO2 SERPL-SCNC: 32 MMOL/L (ref 21–32)
CREAT SERPL-MCNC: 0.84 MG/DL (ref 0.6–1.3)
EOSINOPHIL # BLD AUTO: 0.13 THOUSAND/ΜL (ref 0–0.61)
EOSINOPHIL NFR BLD AUTO: 1 % (ref 0–6)
ERYTHROCYTE [DISTWIDTH] IN BLOOD BY AUTOMATED COUNT: 13.2 % (ref 11.6–15.1)
GFR SERPL CREATININE-BSD FRML MDRD: 63 ML/MIN/1.73SQ M
GLUCOSE SERPL-MCNC: 99 MG/DL (ref 65–140)
HCT VFR BLD AUTO: 32 % (ref 34.8–46.1)
HGB BLD-MCNC: 10.2 G/DL (ref 11.5–15.4)
IMM GRANULOCYTES # BLD AUTO: 0.04 THOUSAND/UL (ref 0–0.2)
IMM GRANULOCYTES NFR BLD AUTO: 0 % (ref 0–2)
LYMPHOCYTES # BLD AUTO: 2.55 THOUSANDS/ΜL (ref 0.6–4.47)
LYMPHOCYTES NFR BLD AUTO: 20 % (ref 14–44)
MCH RBC QN AUTO: 30.5 PG (ref 26.8–34.3)
MCHC RBC AUTO-ENTMCNC: 31.9 G/DL (ref 31.4–37.4)
MCV RBC AUTO: 96 FL (ref 82–98)
MONOCYTES # BLD AUTO: 1.2 THOUSAND/ΜL (ref 0.17–1.22)
MONOCYTES NFR BLD AUTO: 10 % (ref 4–12)
NEUTROPHILS # BLD AUTO: 8.55 THOUSANDS/ΜL (ref 1.85–7.62)
NEUTS SEG NFR BLD AUTO: 69 % (ref 43–75)
NRBC BLD AUTO-RTO: 0 /100 WBCS
P AXIS: 33 DEGREES
P AXIS: 4 DEGREES
PLATELET # BLD AUTO: 220 THOUSANDS/UL (ref 149–390)
PMV BLD AUTO: 9.1 FL (ref 8.9–12.7)
POTASSIUM SERPL-SCNC: 3.7 MMOL/L (ref 3.5–5.3)
PR INTERVAL: 188 MS
PR INTERVAL: 194 MS
QRS AXIS: -12 DEGREES
QRS AXIS: -26 DEGREES
QRSD INTERVAL: 84 MS
QRSD INTERVAL: 86 MS
QT INTERVAL: 408 MS
QT INTERVAL: 422 MS
QTC INTERVAL: 437 MS
QTC INTERVAL: 495 MS
RBC # BLD AUTO: 3.34 MILLION/UL (ref 3.81–5.12)
SODIUM SERPL-SCNC: 142 MMOL/L (ref 136–145)
T WAVE AXIS: -10 DEGREES
T WAVE AXIS: 3 DEGREES
VENTRICULAR RATE: 69 BPM
VENTRICULAR RATE: 83 BPM
WBC # BLD AUTO: 12.49 THOUSAND/UL (ref 4.31–10.16)

## 2019-05-30 PROCEDURE — 93010 ELECTROCARDIOGRAM REPORT: CPT | Performed by: INTERNAL MEDICINE

## 2019-05-30 PROCEDURE — 85025 COMPLETE CBC W/AUTO DIFF WBC: CPT | Performed by: FAMILY MEDICINE

## 2019-05-30 PROCEDURE — 99232 SBSQ HOSP IP/OBS MODERATE 35: CPT | Performed by: FAMILY MEDICINE

## 2019-05-30 PROCEDURE — 80048 BASIC METABOLIC PNL TOTAL CA: CPT | Performed by: FAMILY MEDICINE

## 2019-05-30 PROCEDURE — 99232 SBSQ HOSP IP/OBS MODERATE 35: CPT | Performed by: INTERNAL MEDICINE

## 2019-05-30 RX ADMIN — CEFAZOLIN SODIUM 2000 MG: 2 SOLUTION INTRAVENOUS at 21:09

## 2019-05-30 RX ADMIN — HEPARIN SODIUM 5000 UNITS: 5000 INJECTION INTRAVENOUS; SUBCUTANEOUS at 21:09

## 2019-05-30 RX ADMIN — HEPARIN SODIUM 5000 UNITS: 5000 INJECTION INTRAVENOUS; SUBCUTANEOUS at 05:19

## 2019-05-30 RX ADMIN — HEPARIN SODIUM 5000 UNITS: 5000 INJECTION INTRAVENOUS; SUBCUTANEOUS at 13:01

## 2019-05-30 RX ADMIN — BUMETANIDE 1 MG: 0.25 INJECTION INTRAMUSCULAR; INTRAVENOUS at 09:13

## 2019-05-30 RX ADMIN — METOPROLOL TARTRATE 12.5 MG: 25 TABLET ORAL at 21:15

## 2019-05-30 RX ADMIN — CEFAZOLIN SODIUM 2000 MG: 2 SOLUTION INTRAVENOUS at 04:34

## 2019-05-30 RX ADMIN — METOPROLOL TARTRATE 12.5 MG: 25 TABLET ORAL at 11:12

## 2019-05-30 RX ADMIN — CARBIDOPA AND LEVODOPA 1 TABLET: 25; 100 TABLET ORAL at 09:13

## 2019-05-30 RX ADMIN — SPIRONOLACTONE 25 MG: 25 TABLET ORAL at 09:13

## 2019-05-30 RX ADMIN — CARBIDOPA AND LEVODOPA 1 TABLET: 25; 100 TABLET ORAL at 16:09

## 2019-05-30 RX ADMIN — CEFAZOLIN SODIUM 2000 MG: 2 SOLUTION INTRAVENOUS at 12:59

## 2019-05-30 RX ADMIN — CARBIDOPA AND LEVODOPA 1 TABLET: 25; 100 TABLET ORAL at 21:09

## 2019-05-31 LAB
ANION GAP SERPL CALCULATED.3IONS-SCNC: 7 MMOL/L (ref 4–13)
BACTERIA UR CULT: ABNORMAL
BASOPHILS # BLD AUTO: 0.02 THOUSANDS/ΜL (ref 0–0.1)
BASOPHILS NFR BLD AUTO: 0 % (ref 0–1)
BUN SERPL-MCNC: 18 MG/DL (ref 5–25)
CALCIUM SERPL-MCNC: 9 MG/DL (ref 8.3–10.1)
CHLORIDE SERPL-SCNC: 99 MMOL/L (ref 100–108)
CO2 SERPL-SCNC: 30 MMOL/L (ref 21–32)
CREAT SERPL-MCNC: 0.81 MG/DL (ref 0.6–1.3)
EOSINOPHIL # BLD AUTO: 0.03 THOUSAND/ΜL (ref 0–0.61)
EOSINOPHIL NFR BLD AUTO: 0 % (ref 0–6)
ERYTHROCYTE [DISTWIDTH] IN BLOOD BY AUTOMATED COUNT: 13.2 % (ref 11.6–15.1)
FERRITIN SERPL-MCNC: 401 NG/ML (ref 8–388)
FOLATE SERPL-MCNC: >20 NG/ML (ref 3.1–17.5)
GFR SERPL CREATININE-BSD FRML MDRD: 66 ML/MIN/1.73SQ M
GLUCOSE SERPL-MCNC: 109 MG/DL (ref 65–140)
HCT VFR BLD AUTO: 33.6 % (ref 34.8–46.1)
HGB BLD-MCNC: 10.9 G/DL (ref 11.5–15.4)
IMM GRANULOCYTES # BLD AUTO: 0.05 THOUSAND/UL (ref 0–0.2)
IMM GRANULOCYTES NFR BLD AUTO: 0 % (ref 0–2)
LYMPHOCYTES # BLD AUTO: 1.89 THOUSANDS/ΜL (ref 0.6–4.47)
LYMPHOCYTES NFR BLD AUTO: 15 % (ref 14–44)
MCH RBC QN AUTO: 31 PG (ref 26.8–34.3)
MCHC RBC AUTO-ENTMCNC: 32.4 G/DL (ref 31.4–37.4)
MCV RBC AUTO: 96 FL (ref 82–98)
MONOCYTES # BLD AUTO: 1.15 THOUSAND/ΜL (ref 0.17–1.22)
MONOCYTES NFR BLD AUTO: 9 % (ref 4–12)
NEUTROPHILS # BLD AUTO: 9.55 THOUSANDS/ΜL (ref 1.85–7.62)
NEUTS SEG NFR BLD AUTO: 76 % (ref 43–75)
NRBC BLD AUTO-RTO: 0 /100 WBCS
PLATELET # BLD AUTO: 219 THOUSANDS/UL (ref 149–390)
PMV BLD AUTO: 9.3 FL (ref 8.9–12.7)
POTASSIUM SERPL-SCNC: 3.8 MMOL/L (ref 3.5–5.3)
RBC # BLD AUTO: 3.52 MILLION/UL (ref 3.81–5.12)
SODIUM SERPL-SCNC: 136 MMOL/L (ref 136–145)
VIT B12 SERPL-MCNC: 610 PG/ML (ref 100–900)
WBC # BLD AUTO: 12.69 THOUSAND/UL (ref 4.31–10.16)

## 2019-05-31 PROCEDURE — 82746 ASSAY OF FOLIC ACID SERUM: CPT | Performed by: FAMILY MEDICINE

## 2019-05-31 PROCEDURE — 80048 BASIC METABOLIC PNL TOTAL CA: CPT | Performed by: FAMILY MEDICINE

## 2019-05-31 PROCEDURE — 82607 VITAMIN B-12: CPT | Performed by: FAMILY MEDICINE

## 2019-05-31 PROCEDURE — 82728 ASSAY OF FERRITIN: CPT | Performed by: FAMILY MEDICINE

## 2019-05-31 PROCEDURE — 99232 SBSQ HOSP IP/OBS MODERATE 35: CPT | Performed by: FAMILY MEDICINE

## 2019-05-31 PROCEDURE — 99232 SBSQ HOSP IP/OBS MODERATE 35: CPT | Performed by: INTERNAL MEDICINE

## 2019-05-31 PROCEDURE — 85025 COMPLETE CBC W/AUTO DIFF WBC: CPT | Performed by: FAMILY MEDICINE

## 2019-05-31 RX ORDER — CEPHALEXIN 500 MG/1
500 CAPSULE ORAL EVERY 12 HOURS SCHEDULED
Status: DISCONTINUED | OUTPATIENT
Start: 2019-05-31 | End: 2019-06-01

## 2019-05-31 RX ADMIN — METOPROLOL TARTRATE 12.5 MG: 25 TABLET ORAL at 22:01

## 2019-05-31 RX ADMIN — CARBIDOPA AND LEVODOPA 1 TABLET: 25; 100 TABLET ORAL at 17:52

## 2019-05-31 RX ADMIN — CEFAZOLIN SODIUM 2000 MG: 2 SOLUTION INTRAVENOUS at 04:41

## 2019-05-31 RX ADMIN — HEPARIN SODIUM 5000 UNITS: 5000 INJECTION INTRAVENOUS; SUBCUTANEOUS at 13:26

## 2019-05-31 RX ADMIN — HEPARIN SODIUM 5000 UNITS: 5000 INJECTION INTRAVENOUS; SUBCUTANEOUS at 22:02

## 2019-05-31 RX ADMIN — CEFAZOLIN SODIUM 2000 MG: 2 SOLUTION INTRAVENOUS at 13:26

## 2019-05-31 RX ADMIN — CARBIDOPA AND LEVODOPA 1 TABLET: 25; 100 TABLET ORAL at 22:01

## 2019-05-31 RX ADMIN — CEPHALEXIN 500 MG: 500 CAPSULE ORAL at 22:01

## 2019-05-31 RX ADMIN — HEPARIN SODIUM 5000 UNITS: 5000 INJECTION INTRAVENOUS; SUBCUTANEOUS at 05:12

## 2019-05-31 RX ADMIN — SPIRONOLACTONE 25 MG: 25 TABLET ORAL at 09:18

## 2019-05-31 RX ADMIN — METOPROLOL TARTRATE 12.5 MG: 25 TABLET ORAL at 09:18

## 2019-05-31 RX ADMIN — CARBIDOPA AND LEVODOPA 1 TABLET: 25; 100 TABLET ORAL at 09:18

## 2019-06-01 PROBLEM — R60.0 BILATERAL LOWER EXTREMITY EDEMA: Status: RESOLVED | Noted: 2019-05-28 | Resolved: 2019-06-01

## 2019-06-01 PROBLEM — N30.00 ACUTE CYSTITIS: Status: RESOLVED | Noted: 2019-05-30 | Resolved: 2019-06-01

## 2019-06-01 PROBLEM — L03.115 CELLULITIS OF RIGHT LOWER EXTREMITY: Status: RESOLVED | Noted: 2019-05-28 | Resolved: 2019-06-01

## 2019-06-01 LAB
ANION GAP SERPL CALCULATED.3IONS-SCNC: 8 MMOL/L (ref 4–13)
BASOPHILS # BLD AUTO: 0.03 THOUSANDS/ΜL (ref 0–0.1)
BASOPHILS NFR BLD AUTO: 0 % (ref 0–1)
BUN SERPL-MCNC: 16 MG/DL (ref 5–25)
CALCIUM SERPL-MCNC: 9.3 MG/DL (ref 8.3–10.1)
CHLORIDE SERPL-SCNC: 103 MMOL/L (ref 100–108)
CO2 SERPL-SCNC: 29 MMOL/L (ref 21–32)
CREAT SERPL-MCNC: 0.74 MG/DL (ref 0.6–1.3)
EOSINOPHIL # BLD AUTO: 0.02 THOUSAND/ΜL (ref 0–0.61)
EOSINOPHIL NFR BLD AUTO: 0 % (ref 0–6)
ERYTHROCYTE [DISTWIDTH] IN BLOOD BY AUTOMATED COUNT: 13.4 % (ref 11.6–15.1)
GFR SERPL CREATININE-BSD FRML MDRD: 74 ML/MIN/1.73SQ M
GLUCOSE SERPL-MCNC: 118 MG/DL (ref 65–140)
HCT VFR BLD AUTO: 33.4 % (ref 34.8–46.1)
HGB BLD-MCNC: 11 G/DL (ref 11.5–15.4)
IMM GRANULOCYTES # BLD AUTO: 0.07 THOUSAND/UL (ref 0–0.2)
IMM GRANULOCYTES NFR BLD AUTO: 1 % (ref 0–2)
LYMPHOCYTES # BLD AUTO: 1.56 THOUSANDS/ΜL (ref 0.6–4.47)
LYMPHOCYTES NFR BLD AUTO: 12 % (ref 14–44)
MCH RBC QN AUTO: 31.4 PG (ref 26.8–34.3)
MCHC RBC AUTO-ENTMCNC: 32.9 G/DL (ref 31.4–37.4)
MCV RBC AUTO: 95 FL (ref 82–98)
MONOCYTES # BLD AUTO: 1.19 THOUSAND/ΜL (ref 0.17–1.22)
MONOCYTES NFR BLD AUTO: 9 % (ref 4–12)
NEUTROPHILS # BLD AUTO: 9.91 THOUSANDS/ΜL (ref 1.85–7.62)
NEUTS SEG NFR BLD AUTO: 78 % (ref 43–75)
NRBC BLD AUTO-RTO: 0 /100 WBCS
PLATELET # BLD AUTO: 224 THOUSANDS/UL (ref 149–390)
PMV BLD AUTO: 9.5 FL (ref 8.9–12.7)
POTASSIUM SERPL-SCNC: 4.2 MMOL/L (ref 3.5–5.3)
RBC # BLD AUTO: 3.5 MILLION/UL (ref 3.81–5.12)
SODIUM SERPL-SCNC: 140 MMOL/L (ref 136–145)
WBC # BLD AUTO: 12.78 THOUSAND/UL (ref 4.31–10.16)

## 2019-06-01 PROCEDURE — 99232 SBSQ HOSP IP/OBS MODERATE 35: CPT | Performed by: FAMILY MEDICINE

## 2019-06-01 PROCEDURE — 80048 BASIC METABOLIC PNL TOTAL CA: CPT | Performed by: FAMILY MEDICINE

## 2019-06-01 PROCEDURE — 85025 COMPLETE CBC W/AUTO DIFF WBC: CPT | Performed by: FAMILY MEDICINE

## 2019-06-01 RX ORDER — CEPHALEXIN 500 MG/1
500 CAPSULE ORAL EVERY 12 HOURS SCHEDULED
Qty: 10 CAPSULE | Refills: 0 | Status: SHIPPED | OUTPATIENT
Start: 2019-06-01 | End: 2019-06-02 | Stop reason: HOSPADM

## 2019-06-01 RX ORDER — AMOXICILLIN AND CLAVULANATE POTASSIUM 875; 125 MG/1; MG/1
1 TABLET, FILM COATED ORAL EVERY 12 HOURS SCHEDULED
Status: DISCONTINUED | OUTPATIENT
Start: 2019-06-01 | End: 2019-06-03 | Stop reason: HOSPADM

## 2019-06-01 RX ORDER — FERROUS SULFATE 325(65) MG
325 TABLET ORAL
Refills: 0
Start: 2019-06-01

## 2019-06-01 RX ADMIN — METOPROLOL TARTRATE 12.5 MG: 25 TABLET ORAL at 09:34

## 2019-06-01 RX ADMIN — AMOXICILLIN AND CLAVULANATE POTASSIUM 1 TABLET: 875; 125 TABLET, FILM COATED ORAL at 22:10

## 2019-06-01 RX ADMIN — CEPHALEXIN 500 MG: 500 CAPSULE ORAL at 09:34

## 2019-06-01 RX ADMIN — CARBIDOPA AND LEVODOPA 1 TABLET: 25; 100 TABLET ORAL at 09:34

## 2019-06-01 RX ADMIN — CARBIDOPA AND LEVODOPA 1 TABLET: 25; 100 TABLET ORAL at 22:10

## 2019-06-01 RX ADMIN — CARBIDOPA AND LEVODOPA 1 TABLET: 25; 100 TABLET ORAL at 15:13

## 2019-06-01 RX ADMIN — HEPARIN SODIUM 5000 UNITS: 5000 INJECTION INTRAVENOUS; SUBCUTANEOUS at 14:28

## 2019-06-01 RX ADMIN — HEPARIN SODIUM 5000 UNITS: 5000 INJECTION INTRAVENOUS; SUBCUTANEOUS at 05:31

## 2019-06-01 RX ADMIN — METOPROLOL TARTRATE 12.5 MG: 25 TABLET ORAL at 22:11

## 2019-06-01 RX ADMIN — HEPARIN SODIUM 5000 UNITS: 5000 INJECTION INTRAVENOUS; SUBCUTANEOUS at 22:10

## 2019-06-02 LAB
ANION GAP SERPL CALCULATED.3IONS-SCNC: 7 MMOL/L (ref 4–13)
BASOPHILS # BLD AUTO: 0.03 THOUSANDS/ΜL (ref 0–0.1)
BASOPHILS NFR BLD AUTO: 0 % (ref 0–1)
BUN SERPL-MCNC: 20 MG/DL (ref 5–25)
CALCIUM SERPL-MCNC: 9.2 MG/DL (ref 8.3–10.1)
CHLORIDE SERPL-SCNC: 100 MMOL/L (ref 100–108)
CO2 SERPL-SCNC: 29 MMOL/L (ref 21–32)
CREAT SERPL-MCNC: 0.71 MG/DL (ref 0.6–1.3)
EOSINOPHIL # BLD AUTO: 0.11 THOUSAND/ΜL (ref 0–0.61)
EOSINOPHIL NFR BLD AUTO: 1 % (ref 0–6)
ERYTHROCYTE [DISTWIDTH] IN BLOOD BY AUTOMATED COUNT: 13.2 % (ref 11.6–15.1)
GFR SERPL CREATININE-BSD FRML MDRD: 77 ML/MIN/1.73SQ M
GLUCOSE SERPL-MCNC: 100 MG/DL (ref 65–140)
HCT VFR BLD AUTO: 34.5 % (ref 34.8–46.1)
HGB BLD-MCNC: 11.2 G/DL (ref 11.5–15.4)
IMM GRANULOCYTES # BLD AUTO: 0.06 THOUSAND/UL (ref 0–0.2)
IMM GRANULOCYTES NFR BLD AUTO: 1 % (ref 0–2)
LYMPHOCYTES # BLD AUTO: 2.08 THOUSANDS/ΜL (ref 0.6–4.47)
LYMPHOCYTES NFR BLD AUTO: 19 % (ref 14–44)
MCH RBC QN AUTO: 31.4 PG (ref 26.8–34.3)
MCHC RBC AUTO-ENTMCNC: 32.5 G/DL (ref 31.4–37.4)
MCV RBC AUTO: 97 FL (ref 82–98)
MONOCYTES # BLD AUTO: 1.19 THOUSAND/ΜL (ref 0.17–1.22)
MONOCYTES NFR BLD AUTO: 11 % (ref 4–12)
NEUTROPHILS # BLD AUTO: 7.68 THOUSANDS/ΜL (ref 1.85–7.62)
NEUTS SEG NFR BLD AUTO: 68 % (ref 43–75)
NRBC BLD AUTO-RTO: 0 /100 WBCS
PLATELET # BLD AUTO: 210 THOUSANDS/UL (ref 149–390)
PMV BLD AUTO: 10.9 FL (ref 8.9–12.7)
POTASSIUM SERPL-SCNC: 4.8 MMOL/L (ref 3.5–5.3)
RBC # BLD AUTO: 3.57 MILLION/UL (ref 3.81–5.12)
SODIUM SERPL-SCNC: 136 MMOL/L (ref 136–145)
WBC # BLD AUTO: 11.15 THOUSAND/UL (ref 4.31–10.16)

## 2019-06-02 PROCEDURE — 85025 COMPLETE CBC W/AUTO DIFF WBC: CPT | Performed by: FAMILY MEDICINE

## 2019-06-02 PROCEDURE — 80048 BASIC METABOLIC PNL TOTAL CA: CPT | Performed by: FAMILY MEDICINE

## 2019-06-02 PROCEDURE — 99232 SBSQ HOSP IP/OBS MODERATE 35: CPT | Performed by: FAMILY MEDICINE

## 2019-06-02 RX ORDER — AMOXICILLIN AND CLAVULANATE POTASSIUM 875; 125 MG/1; MG/1
1 TABLET, FILM COATED ORAL EVERY 12 HOURS SCHEDULED
Qty: 8 TABLET | Refills: 0 | Status: SHIPPED | OUTPATIENT
Start: 2019-06-02 | End: 2019-06-06

## 2019-06-02 RX ADMIN — HEPARIN SODIUM 5000 UNITS: 5000 INJECTION INTRAVENOUS; SUBCUTANEOUS at 22:09

## 2019-06-02 RX ADMIN — AMOXICILLIN AND CLAVULANATE POTASSIUM 1 TABLET: 875; 125 TABLET, FILM COATED ORAL at 09:06

## 2019-06-02 RX ADMIN — AMOXICILLIN AND CLAVULANATE POTASSIUM 1 TABLET: 875; 125 TABLET, FILM COATED ORAL at 22:10

## 2019-06-02 RX ADMIN — CARBIDOPA AND LEVODOPA 1 TABLET: 25; 100 TABLET ORAL at 09:06

## 2019-06-02 RX ADMIN — METOPROLOL TARTRATE 12.5 MG: 25 TABLET ORAL at 22:15

## 2019-06-02 RX ADMIN — CARBIDOPA AND LEVODOPA 1 TABLET: 25; 100 TABLET ORAL at 17:45

## 2019-06-02 RX ADMIN — METOPROLOL TARTRATE 12.5 MG: 25 TABLET ORAL at 09:06

## 2019-06-02 RX ADMIN — HEPARIN SODIUM 5000 UNITS: 5000 INJECTION INTRAVENOUS; SUBCUTANEOUS at 13:36

## 2019-06-02 RX ADMIN — HEPARIN SODIUM 5000 UNITS: 5000 INJECTION INTRAVENOUS; SUBCUTANEOUS at 06:32

## 2019-06-02 RX ADMIN — CARBIDOPA AND LEVODOPA 1 TABLET: 25; 100 TABLET ORAL at 22:00

## 2019-06-03 VITALS
DIASTOLIC BLOOD PRESSURE: 61 MMHG | TEMPERATURE: 98.5 F | SYSTOLIC BLOOD PRESSURE: 131 MMHG | HEIGHT: 65 IN | OXYGEN SATURATION: 90 % | WEIGHT: 178.57 LBS | BODY MASS INDEX: 29.75 KG/M2 | HEART RATE: 82 BPM | RESPIRATION RATE: 18 BRPM

## 2019-06-03 LAB
BACTERIA BLD CULT: NORMAL
BACTERIA BLD CULT: NORMAL

## 2019-06-03 PROCEDURE — 99239 HOSP IP/OBS DSCHRG MGMT >30: CPT | Performed by: FAMILY MEDICINE

## 2019-06-03 RX ADMIN — AMOXICILLIN AND CLAVULANATE POTASSIUM 1 TABLET: 875; 125 TABLET, FILM COATED ORAL at 09:09

## 2019-06-03 RX ADMIN — METOPROLOL TARTRATE 12.5 MG: 25 TABLET ORAL at 09:09

## 2019-06-03 RX ADMIN — CARBIDOPA AND LEVODOPA 1 TABLET: 25; 100 TABLET ORAL at 09:09

## 2019-06-03 RX ADMIN — HEPARIN SODIUM 5000 UNITS: 5000 INJECTION INTRAVENOUS; SUBCUTANEOUS at 05:52

## 2019-06-19 ENCOUNTER — OFFICE VISIT (OUTPATIENT)
Dept: CARDIOLOGY CLINIC | Facility: CLINIC | Age: 84
End: 2019-06-19
Payer: MEDICARE

## 2019-06-19 VITALS — SYSTOLIC BLOOD PRESSURE: 136 MMHG | HEART RATE: 88 BPM | DIASTOLIC BLOOD PRESSURE: 86 MMHG

## 2019-06-19 DIAGNOSIS — I10 ESSENTIAL HYPERTENSION: ICD-10-CM

## 2019-06-19 DIAGNOSIS — I50.9 CHF EXACERBATION (HCC): ICD-10-CM

## 2019-06-19 DIAGNOSIS — I50.32 CHRONIC DIASTOLIC CONGESTIVE HEART FAILURE (HCC): Primary | ICD-10-CM

## 2019-06-19 DIAGNOSIS — R01.1 CARDIAC MURMUR: ICD-10-CM

## 2019-06-19 DIAGNOSIS — E78.2 MIXED HYPERLIPIDEMIA: ICD-10-CM

## 2019-06-19 PROCEDURE — 99214 OFFICE O/P EST MOD 30 MIN: CPT | Performed by: PHYSICIAN ASSISTANT

## 2019-06-19 PROCEDURE — 1124F ACP DISCUSS-NO DSCNMKR DOCD: CPT | Performed by: PHYSICIAN ASSISTANT

## 2019-06-19 RX ORDER — FUROSEMIDE 20 MG/1
20 TABLET ORAL DAILY
COMMUNITY
End: 2019-06-19

## 2019-06-19 RX ORDER — FUROSEMIDE 40 MG/1
40 TABLET ORAL DAILY
Qty: 30 TABLET | Refills: 3
Start: 2019-06-19 | End: 2019-11-25 | Stop reason: HOSPADM

## 2019-07-22 ENCOUNTER — HOSPITAL ENCOUNTER (OUTPATIENT)
Dept: NON INVASIVE DIAGNOSTICS | Facility: CLINIC | Age: 84
Discharge: HOME | End: 2019-07-22
Payer: COMMERCIAL

## 2019-07-22 DIAGNOSIS — I10 ESSENTIAL HYPERTENSION: ICD-10-CM

## 2019-07-22 DIAGNOSIS — R01.1 CARDIAC MURMUR: ICD-10-CM

## 2019-07-22 DIAGNOSIS — I50.32 CHRONIC DIASTOLIC CONGESTIVE HEART FAILURE (HCC): ICD-10-CM

## 2019-07-22 DIAGNOSIS — I50.9 CHF EXACERBATION (HCC): ICD-10-CM

## 2019-07-22 PROCEDURE — 93306 TTE W/DOPPLER COMPLETE: CPT | Performed by: INTERNAL MEDICINE

## 2019-07-22 PROCEDURE — 93306 TTE W/DOPPLER COMPLETE: CPT

## 2019-07-23 ENCOUNTER — TELEPHONE (OUTPATIENT)
Dept: CARDIOLOGY CLINIC | Facility: CLINIC | Age: 84
End: 2019-07-23

## 2019-07-23 NOTE — TELEPHONE ENCOUNTER
----- Message from Fernando Raza DO sent at 7/22/2019  4:13 PM EDT -----  Call patient  Decrease Lasix to 20 mg daily  Increase Metoprolol TARTRATE to 37 5 mg BID

## 2019-07-23 NOTE — TELEPHONE ENCOUNTER
Called over to [facility] to make them aware of medication changes   Faxing the note over to them now @ [phone]

## 2019-09-25 ENCOUNTER — OFFICE VISIT (OUTPATIENT)
Dept: CARDIOLOGY CLINIC | Facility: CLINIC | Age: 84
End: 2019-09-25
Payer: MEDICARE

## 2019-09-25 VITALS
BODY MASS INDEX: 29.72 KG/M2 | HEIGHT: 65 IN | SYSTOLIC BLOOD PRESSURE: 106 MMHG | DIASTOLIC BLOOD PRESSURE: 60 MMHG | HEART RATE: 80 BPM

## 2019-09-25 DIAGNOSIS — I35.0 NONRHEUMATIC AORTIC VALVE STENOSIS: Primary | ICD-10-CM

## 2019-09-25 DIAGNOSIS — I50.32 CHRONIC DIASTOLIC CONGESTIVE HEART FAILURE (HCC): ICD-10-CM

## 2019-09-25 DIAGNOSIS — I10 ESSENTIAL HYPERTENSION: ICD-10-CM

## 2019-09-25 DIAGNOSIS — I34.0 NON-RHEUMATIC MITRAL REGURGITATION: ICD-10-CM

## 2019-09-25 DIAGNOSIS — E78.2 MIXED HYPERLIPIDEMIA: ICD-10-CM

## 2019-09-25 PROCEDURE — 99214 OFFICE O/P EST MOD 30 MIN: CPT | Performed by: INTERNAL MEDICINE

## 2019-09-25 NOTE — ASSESSMENT & PLAN NOTE
Edema still present    Continue Lasix 40 mg daily   Elevate LE as much as fiesable   Renal function stable

## 2019-09-25 NOTE — PROGRESS NOTES
[facility]   Outpatient Note  Henrik Grewal  12/24/1932  [id number]  [facility], [facility] [address] [place] [id number]  [place] [address]    Henrik Grewal is a 80 y o  female    Assessment and Plan:     Problem List Items Addressed This Visit        Cardiovascular and Mediastinum    Diastolic congestive heart failure (HCC)    Essential hypertension    Non-rheumatic mitral regurgitation       Other    Mixed hyperlipidemia      Other Visit Diagnoses     Nonrheumatic aortic valve stenosis    -  Primary           Additional Plan:   Medication changes as outlined      Return in Four months unless there are problems earlier  Subjective:   Patient is brought from the NH and is by her self today  She is not able to give me a history as she is aphasic  She is seen in the office  for a visit regarding diastolic CHF, chronic edema and AS  An echo is reviewed that shows a hyperdynamic LV, the aortic valve is better visualized on last echo  She is not cooperative with examination  She pleasantly smiles  Social History  Social History     Tobacco Use   Smoking Status Never Smoker   Smokeless Tobacco Never Used   ,   Social History     Substance and Sexual Activity   Alcohol Use Never    Frequency: Never   ,   Social History     Substance and Sexual Activity   Drug Use No     History reviewed  No pertinent family history      Medical and Surgical History  Past Medical History:   Diagnosis Date    Anemia     CAD (coronary artery disease)     CHF (congestive heart failure) (HCC)     COPD (chronic obstructive pulmonary disease) (HCC)     Dementia     Diverticulosis     Edema     Gout     Nuclear stress test 08/24/2012    Normal, EF 86%    Parkinson disease ([facility] [address] )      Past Surgical History:   Procedure Laterality Date    CARDIAC CATHETERIZATION  08/17/2009    20% stenosis in the proximal LAD, 40% stenosis mid LAD, 50% stenosis in D2, 40% stenosis in proximal RCA, 30% stenosis mid RCA and 40% stenosis distal RCA  Medical management         Current Outpatient Medications:     acetaminophen (TYLENOL) 325 mg tablet, Take 650 mg by mouth every 4 (four) hours as needed for mild pain , Disp: , Rfl:     carbidopa-levodopa (SINEMET)  mg per tablet, Take 1 5 tablets by mouth 4 (four) times a day , Disp: , Rfl:     cyanocobalamin (VITAMIN B-12) 1,000 mcg tablet, Take by mouth daily, Disp: , Rfl:     ergocalciferol (VITAMIN D2) 50,000 units, Take 50,000 Units by mouth once a week, Disp: , Rfl:     ferrous sulfate 325 (65 Fe) mg tablet, Take 1 tablet (325 mg total) by mouth daily with breakfast, Disp: , Rfl: 0    furosemide (LASIX) 40 mg tablet, Take 1 tablet (40 mg total) by mouth daily, Disp: 30 tablet, Rfl: 3    loperamide (IMODIUM) 2 mg capsule, Take 2 mg by mouth 4 (four) times a day as needed for diarrhea, Disp: , Rfl:     metoprolol tartrate (LOPRESSOR) 25 mg tablet, Take 1 tablet (25 mg total) by mouth every 12 (twelve) hours, Disp: 60 tablet, Rfl: 0    nystatin (MYCOSTATIN) cream, Apply topically 2 (two) times a day, Disp: , Rfl:     simethicone (MYLICON) 80 mg chewable tablet, Chew 80 mg every 6 (six) hours as needed for flatulence, Disp: , Rfl:     triamcinolone (KENALOG) 0 1 % cream, Apply topically 2 (two) times a day, Disp: , Rfl:   Allergies   Allergen Reactions    Sulfa Antibiotics        Review of Systems   Unable to perform ROS: dementia       Objective:   /60   Pulse 80   Ht 5' 5" (1 651 m)   BMI 29 72 kg/m²   Physical Exam   Cardiovascular: Normal rate  A regularly irregular rhythm present  Murmur heard  Systolic murmur is present with a grade of 2/6 at the upper right sternal border  Musculoskeletal: She exhibits edema (2+ edema bilaterally)      limited because patient is not cooperative    Lab Review:   No results found for: CHOL  No results found for: HDL  No results found for: [facility]  No results found for: TRIG  Results Reviewed     None        Results Reviewed     None        Results Reviewed     None          Recent Cardiovascular Testing:   ECHO 7-22-19 :   PROCEDURE INFORMATION:  Technically poor study  Limited LVOT/AV velocities recorded  No maneuvers performed(ie valsalva)  Clinical correlation advised      LEFT VENTRICLE:  Systolic function was vigorous  Ejection fraction was estimated to be 80 %  There were no regional wall motion abnormalities  Wall thickness was mildly increased  There was mild concentric hypertrophy    RIGHT VENTRICLE:  Systolic function was normal    MITRAL VALVE:  There was mild to moderate annular calcification  There was trace regurgitation    AORTIC VALVE:  There was trace regurgitation    TRICUSPID VALVE:  There was trace regurgitation    PULMONIC VALVE:  There was trace regurgitation    PERICARDIUM:  There was no pericardial effusion  Echo 0-74-67: Systolic function was vigorous  Ejection fraction was estimated to be 70 %  There were no regional wall motion abnormalities  There was mild concentric hypertrophy      ECG Review:   5-28-19: Sinus rhythm with Premature atrial complexes, Minimal voltage criteria for LVH, may be normal variant

## 2019-09-25 NOTE — ASSESSMENT & PLAN NOTE
I hear a mild to moderate AS on examination    Only Mild AS on Echo     In light of her hyperdynamic LV   Continue BB

## 2019-11-21 ENCOUNTER — HOSPITAL ENCOUNTER (INPATIENT)
Facility: HOSPITAL | Age: 84
LOS: 4 days | Discharge: HOSPICE | DRG: 378 | End: 2019-11-25
Attending: EMERGENCY MEDICINE | Admitting: INTERNAL MEDICINE
Payer: MEDICARE

## 2019-11-21 DIAGNOSIS — D64.9 ANEMIA: Primary | ICD-10-CM

## 2019-11-21 PROBLEM — I50.32 CHRONIC DIASTOLIC CONGESTIVE HEART FAILURE (HCC): Chronic | Status: ACTIVE | Noted: 2019-05-30

## 2019-11-21 PROBLEM — I10 ESSENTIAL HYPERTENSION: Chronic | Status: ACTIVE | Noted: 2019-06-19

## 2019-11-21 PROBLEM — G20 PARKINSON DISEASE (HCC): Chronic | Status: ACTIVE | Noted: 2019-11-21

## 2019-11-21 PROBLEM — G30.9 ALZHEIMER'S DEMENTIA WITHOUT BEHAVIORAL DISTURBANCE (HCC): Chronic | Status: ACTIVE | Noted: 2019-05-29

## 2019-11-21 PROBLEM — F02.80 ALZHEIMER'S DEMENTIA WITHOUT BEHAVIORAL DISTURBANCE (HCC): Chronic | Status: ACTIVE | Noted: 2019-05-29

## 2019-11-21 PROBLEM — F02.80 ALZHEIMER'S DEMENTIA WITHOUT BEHAVIORAL DISTURBANCE (HCC): Status: ACTIVE | Noted: 2019-05-29

## 2019-11-21 PROBLEM — L03.115 CELLULITIS OF RIGHT LOWER EXTREMITY: Chronic | Status: ACTIVE | Noted: 2019-05-28

## 2019-11-21 PROBLEM — J44.9 COPD (CHRONIC OBSTRUCTIVE PULMONARY DISEASE) (HCC): Chronic | Status: ACTIVE | Noted: 2019-11-21

## 2019-11-21 PROBLEM — D50.8 IRON DEFICIENCY ANEMIA SECONDARY TO INADEQUATE DIETARY IRON INTAKE: Chronic | Status: ACTIVE | Noted: 2019-11-21

## 2019-11-21 PROBLEM — G20 PARKINSON DISEASE (HCC): Status: ACTIVE | Noted: 2019-11-21

## 2019-11-21 PROBLEM — G30.9 ALZHEIMER'S DEMENTIA WITHOUT BEHAVIORAL DISTURBANCE (HCC): Status: ACTIVE | Noted: 2019-05-29

## 2019-11-21 PROBLEM — E78.2 MIXED HYPERLIPIDEMIA: Chronic | Status: ACTIVE | Noted: 2019-06-19

## 2019-11-21 LAB
ALBUMIN SERPL BCP-MCNC: 3 G/DL (ref 3.5–5.7)
ALP SERPL-CCNC: 106 U/L (ref 55–165)
ALT SERPL W P-5'-P-CCNC: <3 U/L (ref 7–52)
ANION GAP SERPL CALCULATED.3IONS-SCNC: 7 MMOL/L (ref 4–13)
ANISOCYTOSIS BLD QL SMEAR: PRESENT
APTT PPP: 46 SECONDS (ref 23–37)
AST SERPL W P-5'-P-CCNC: 9 U/L (ref 13–39)
BASOPHILS # BLD AUTO: 0 THOUSANDS/ΜL (ref 0–0.1)
BASOPHILS NFR BLD AUTO: 0 % (ref 0–2)
BILIRUB SERPL-MCNC: 0.3 MG/DL (ref 0.2–1)
BUN SERPL-MCNC: 20 MG/DL (ref 7–25)
CALCIUM SERPL-MCNC: 8.2 MG/DL (ref 8.6–10.5)
CHLORIDE SERPL-SCNC: 99 MMOL/L (ref 98–107)
CO2 SERPL-SCNC: 29 MMOL/L (ref 21–31)
CREAT SERPL-MCNC: 0.66 MG/DL (ref 0.6–1.2)
DACRYOCYTES BLD QL SMEAR: PRESENT
EOSINOPHIL # BLD AUTO: 0.1 THOUSAND/ΜL (ref 0–0.61)
EOSINOPHIL NFR BLD AUTO: 1 % (ref 0–5)
ERYTHROCYTE [DISTWIDTH] IN BLOOD BY AUTOMATED COUNT: 21.5 % (ref 11.5–14.5)
GFR SERPL CREATININE-BSD FRML MDRD: 80 ML/MIN/1.73SQ M
GLUCOSE SERPL-MCNC: 100 MG/DL (ref 65–99)
HCT VFR BLD AUTO: 18.9 % (ref 42–47)
HGB BLD-MCNC: 6 G/DL (ref 12–16)
HYPERCHROMIA BLD QL SMEAR: PRESENT
INR PPP: 1.88 (ref 0.9–1.5)
LYMPHOCYTES # BLD AUTO: 2.4 THOUSANDS/ΜL (ref 0.6–4.47)
LYMPHOCYTES NFR BLD AUTO: 21 % (ref 21–51)
MACROCYTES BLD QL AUTO: PRESENT
MCH RBC QN AUTO: 32.4 PG (ref 26–34)
MCHC RBC AUTO-ENTMCNC: 31.6 G/DL (ref 31–37)
MCV RBC AUTO: 103 FL (ref 81–99)
MONOCYTES # BLD AUTO: 1 THOUSAND/ΜL (ref 0.17–1.22)
MONOCYTES NFR BLD AUTO: 8 % (ref 2–12)
NEUTROPHILS # BLD AUTO: 8.1 THOUSANDS/ΜL (ref 1.4–6.5)
NEUTS SEG NFR BLD AUTO: 70 % (ref 42–75)
PLATELET # BLD AUTO: 378 THOUSANDS/UL (ref 149–390)
PLATELET BLD QL SMEAR: ADEQUATE
PMV BLD AUTO: 7.4 FL (ref 8.6–11.7)
POTASSIUM SERPL-SCNC: 4 MMOL/L (ref 3.5–5.5)
PROT SERPL-MCNC: 6.1 G/DL (ref 6.4–8.9)
PROTHROMBIN TIME: 22 SECONDS (ref 10.2–13)
RBC # BLD AUTO: 1.84 MILLION/UL (ref 3.9–5.2)
RBC MORPH BLD: NORMAL
SODIUM SERPL-SCNC: 135 MMOL/L (ref 134–143)
STOMATOCYTES BLD QL SMEAR: PRESENT
TROPONIN I SERPL-MCNC: <0.03 NG/ML
WBC # BLD AUTO: 11.5 THOUSAND/UL (ref 4.8–10.8)

## 2019-11-21 PROCEDURE — C9113 INJ PANTOPRAZOLE SODIUM, VIA: HCPCS | Performed by: NURSE PRACTITIONER

## 2019-11-21 PROCEDURE — 85730 THROMBOPLASTIN TIME PARTIAL: CPT | Performed by: EMERGENCY MEDICINE

## 2019-11-21 PROCEDURE — 84484 ASSAY OF TROPONIN QUANT: CPT | Performed by: EMERGENCY MEDICINE

## 2019-11-21 PROCEDURE — 85025 COMPLETE CBC W/AUTO DIFF WBC: CPT | Performed by: EMERGENCY MEDICINE

## 2019-11-21 PROCEDURE — 93005 ELECTROCARDIOGRAM TRACING: CPT

## 2019-11-21 PROCEDURE — 99284 EMERGENCY DEPT VISIT MOD MDM: CPT

## 2019-11-21 PROCEDURE — 36415 COLL VENOUS BLD VENIPUNCTURE: CPT | Performed by: EMERGENCY MEDICINE

## 2019-11-21 PROCEDURE — 99223 1ST HOSP IP/OBS HIGH 75: CPT | Performed by: NURSE PRACTITIONER

## 2019-11-21 PROCEDURE — 80053 COMPREHEN METABOLIC PANEL: CPT | Performed by: EMERGENCY MEDICINE

## 2019-11-21 PROCEDURE — 99285 EMERGENCY DEPT VISIT HI MDM: CPT | Performed by: EMERGENCY MEDICINE

## 2019-11-21 PROCEDURE — 85610 PROTHROMBIN TIME: CPT | Performed by: EMERGENCY MEDICINE

## 2019-11-21 RX ORDER — NYSTATIN 100000 U/G
CREAM TOPICAL 2 TIMES DAILY
Status: DISCONTINUED | OUTPATIENT
Start: 2019-11-22 | End: 2019-11-25 | Stop reason: HOSPADM

## 2019-11-21 RX ORDER — FERROUS SULFATE 325(65) MG
325 TABLET ORAL
Status: DISCONTINUED | OUTPATIENT
Start: 2019-11-22 | End: 2019-11-25 | Stop reason: HOSPADM

## 2019-11-21 RX ORDER — FUROSEMIDE 40 MG/1
40 TABLET ORAL DAILY
Status: DISCONTINUED | OUTPATIENT
Start: 2019-11-22 | End: 2019-11-21

## 2019-11-21 RX ORDER — ACETAMINOPHEN 325 MG/1
650 TABLET ORAL EVERY 6 HOURS PRN
Status: DISCONTINUED | OUTPATIENT
Start: 2019-11-21 | End: 2019-11-25 | Stop reason: HOSPADM

## 2019-11-21 RX ORDER — ONDANSETRON 2 MG/ML
4 INJECTION INTRAMUSCULAR; INTRAVENOUS EVERY 6 HOURS PRN
Status: DISCONTINUED | OUTPATIENT
Start: 2019-11-21 | End: 2019-11-25 | Stop reason: HOSPADM

## 2019-11-21 RX ORDER — PANTOPRAZOLE SODIUM 40 MG/1
40 INJECTION, POWDER, FOR SOLUTION INTRAVENOUS EVERY 12 HOURS SCHEDULED
Status: DISCONTINUED | OUTPATIENT
Start: 2019-11-21 | End: 2019-11-24

## 2019-11-21 RX ORDER — FUROSEMIDE 20 MG/1
20 TABLET ORAL DAILY
Status: DISCONTINUED | OUTPATIENT
Start: 2019-11-22 | End: 2019-11-22

## 2019-11-21 RX ORDER — CEFAZOLIN SODIUM 2 G/50ML
2000 SOLUTION INTRAVENOUS EVERY 8 HOURS
Status: DISCONTINUED | OUTPATIENT
Start: 2019-11-21 | End: 2019-11-21

## 2019-11-21 RX ORDER — CEPHALEXIN 250 MG/1
250 CAPSULE ORAL EVERY 6 HOURS SCHEDULED
Status: DISCONTINUED | OUTPATIENT
Start: 2019-11-22 | End: 2019-11-25 | Stop reason: HOSPADM

## 2019-11-21 RX ORDER — SIMETHICONE 80 MG
80 TABLET,CHEWABLE ORAL EVERY 6 HOURS PRN
Status: DISCONTINUED | OUTPATIENT
Start: 2019-11-21 | End: 2019-11-25 | Stop reason: HOSPADM

## 2019-11-21 RX ADMIN — PANTOPRAZOLE SODIUM 40 MG: 40 INJECTION, POWDER, FOR SOLUTION INTRAVENOUS at 23:02

## 2019-11-21 RX ADMIN — CEPHALEXIN 250 MG: 250 CAPSULE ORAL at 23:52

## 2019-11-21 RX ADMIN — CARBIDOPA AND LEVODOPA 1.5 TABLET: 25; 100 TABLET ORAL at 23:02

## 2019-11-22 ENCOUNTER — APPOINTMENT (INPATIENT)
Dept: CT IMAGING | Facility: HOSPITAL | Age: 84
DRG: 378 | End: 2019-11-22
Payer: MEDICARE

## 2019-11-22 PROBLEM — D68.9 COAGULOPATHY (HCC): Status: ACTIVE | Noted: 2019-11-22

## 2019-11-22 PROBLEM — D62 ACUTE BLOOD LOSS ANEMIA: Status: ACTIVE | Noted: 2019-11-21

## 2019-11-22 LAB
ABO GROUP BLD: NORMAL
ALBUMIN SERPL BCP-MCNC: 2.4 G/DL (ref 3.5–5.7)
ALP SERPL-CCNC: 84 U/L (ref 55–165)
ALT SERPL W P-5'-P-CCNC: <3 U/L (ref 7–52)
ANION GAP SERPL CALCULATED.3IONS-SCNC: 3 MMOL/L (ref 4–13)
AST SERPL W P-5'-P-CCNC: 12 U/L (ref 13–39)
ATRIAL RATE: 80 BPM
BASOPHILS # BLD AUTO: 0 THOUSANDS/ΜL (ref 0–0.1)
BASOPHILS NFR BLD AUTO: 0 % (ref 0–2)
BILIRUB SERPL-MCNC: 0.3 MG/DL (ref 0.2–1)
BLD GP AB SCN SERPL QL: POSITIVE
BUN SERPL-MCNC: 20 MG/DL (ref 7–25)
CALCIUM SERPL-MCNC: 7.7 MG/DL (ref 8.6–10.5)
CHLORIDE SERPL-SCNC: 102 MMOL/L (ref 98–107)
CO2 SERPL-SCNC: 31 MMOL/L (ref 21–31)
CREAT SERPL-MCNC: 0.57 MG/DL (ref 0.6–1.2)
EOSINOPHIL # BLD AUTO: 0.1 THOUSAND/ΜL (ref 0–0.61)
EOSINOPHIL NFR BLD AUTO: 1 % (ref 0–5)
ERYTHROCYTE [DISTWIDTH] IN BLOOD BY AUTOMATED COUNT: 20.9 % (ref 11.5–14.5)
GFR SERPL CREATININE-BSD FRML MDRD: 84 ML/MIN/1.73SQ M
GLUCOSE SERPL-MCNC: 87 MG/DL (ref 65–99)
GLUCOSE SERPL-MCNC: 88 MG/DL (ref 65–140)
GLUCOSE SERPL-MCNC: 90 MG/DL (ref 65–140)
GLUCOSE SERPL-MCNC: 92 MG/DL (ref 65–140)
GLUCOSE SERPL-MCNC: 93 MG/DL (ref 65–140)
HCT VFR BLD AUTO: 14.7 % (ref 42–47)
HGB BLD-MCNC: 4.9 G/DL (ref 12–16)
HGB BLD-MCNC: 5 G/DL (ref 12–16)
HGB BLD-MCNC: 5.6 G/DL (ref 12–16)
LYMPHOCYTES # BLD AUTO: 2.2 THOUSANDS/ΜL (ref 0.6–4.47)
LYMPHOCYTES NFR BLD AUTO: 22 % (ref 21–51)
MCH RBC QN AUTO: 33.8 PG (ref 26–34)
MCHC RBC AUTO-ENTMCNC: 33.2 G/DL (ref 31–37)
MCV RBC AUTO: 102 FL (ref 81–99)
MONOCYTES # BLD AUTO: 0.7 THOUSAND/ΜL (ref 0.17–1.22)
MONOCYTES NFR BLD AUTO: 7 % (ref 2–12)
NEUTROPHILS # BLD AUTO: 6.7 THOUSANDS/ΜL (ref 1.4–6.5)
NEUTS SEG NFR BLD AUTO: 70 % (ref 42–75)
P AXIS: 34 DEGREES
PLATELET # BLD AUTO: 288 THOUSANDS/UL (ref 149–390)
PMV BLD AUTO: 7.5 FL (ref 8.6–11.7)
POTASSIUM SERPL-SCNC: 4 MMOL/L (ref 3.5–5.5)
PR INTERVAL: 182 MS
PROT SERPL-MCNC: 5 G/DL (ref 6.4–8.9)
QRS AXIS: 0 DEGREES
QRSD INTERVAL: 80 MS
QT INTERVAL: 402 MS
QTC INTERVAL: 463 MS
RBC # BLD AUTO: 1.45 MILLION/UL (ref 3.9–5.2)
RH BLD: POSITIVE
SODIUM SERPL-SCNC: 136 MMOL/L (ref 134–143)
SPECIMEN EXPIRATION DATE: NORMAL
T WAVE AXIS: 41 DEGREES
VENTRICULAR RATE: 80 BPM
WBC # BLD AUTO: 9.6 THOUSAND/UL (ref 4.8–10.8)

## 2019-11-22 PROCEDURE — 74177 CT ABD & PELVIS W/CONTRAST: CPT

## 2019-11-22 PROCEDURE — 99232 SBSQ HOSP IP/OBS MODERATE 35: CPT | Performed by: PHYSICIAN ASSISTANT

## 2019-11-22 PROCEDURE — 85018 HEMOGLOBIN: CPT | Performed by: NURSE PRACTITIONER

## 2019-11-22 PROCEDURE — 97163 PT EVAL HIGH COMPLEX 45 MIN: CPT

## 2019-11-22 PROCEDURE — G8979 MOBILITY GOAL STATUS: HCPCS

## 2019-11-22 PROCEDURE — 97167 OT EVAL HIGH COMPLEX 60 MIN: CPT

## 2019-11-22 PROCEDURE — 82948 REAGENT STRIP/BLOOD GLUCOSE: CPT

## 2019-11-22 PROCEDURE — C9113 INJ PANTOPRAZOLE SODIUM, VIA: HCPCS | Performed by: NURSE PRACTITIONER

## 2019-11-22 PROCEDURE — 86921 COMPATIBILITY TEST INCUBATE: CPT

## 2019-11-22 PROCEDURE — 30233N1 TRANSFUSION OF NONAUTOLOGOUS RED BLOOD CELLS INTO PERIPHERAL VEIN, PERCUTANEOUS APPROACH: ICD-10-PCS | Performed by: INTERNAL MEDICINE

## 2019-11-22 PROCEDURE — P9016 RBC LEUKOCYTES REDUCED: HCPCS

## 2019-11-22 PROCEDURE — 93010 ELECTROCARDIOGRAM REPORT: CPT | Performed by: INTERNAL MEDICINE

## 2019-11-22 PROCEDURE — G8987 SELF CARE CURRENT STATUS: HCPCS

## 2019-11-22 PROCEDURE — 86902 BLOOD TYPE ANTIGEN DONOR EA: CPT

## 2019-11-22 PROCEDURE — G8978 MOBILITY CURRENT STATUS: HCPCS

## 2019-11-22 PROCEDURE — 80053 COMPREHEN METABOLIC PANEL: CPT | Performed by: NURSE PRACTITIONER

## 2019-11-22 PROCEDURE — 86922 COMPATIBILITY TEST ANTIGLOB: CPT

## 2019-11-22 PROCEDURE — 86870 RBC ANTIBODY IDENTIFICATION: CPT | Performed by: INTERNAL MEDICINE

## 2019-11-22 PROCEDURE — 86900 BLOOD TYPING SEROLOGIC ABO: CPT | Performed by: INTERNAL MEDICINE

## 2019-11-22 PROCEDURE — G8988 SELF CARE GOAL STATUS: HCPCS

## 2019-11-22 PROCEDURE — 85025 COMPLETE CBC W/AUTO DIFF WBC: CPT | Performed by: NURSE PRACTITIONER

## 2019-11-22 PROCEDURE — 86850 RBC ANTIBODY SCREEN: CPT | Performed by: INTERNAL MEDICINE

## 2019-11-22 PROCEDURE — 86901 BLOOD TYPING SEROLOGIC RH(D): CPT | Performed by: INTERNAL MEDICINE

## 2019-11-22 RX ORDER — POTASSIUM CHLORIDE 1.5 G/1.77G
20 POWDER, FOR SOLUTION ORAL 2 TIMES DAILY
Status: ON HOLD | COMMUNITY
End: 2019-11-25 | Stop reason: SDUPTHER

## 2019-11-22 RX ORDER — METOLAZONE 2.5 MG/1
2.5 TABLET ORAL DAILY
COMMUNITY
End: 2019-11-25 | Stop reason: HOSPADM

## 2019-11-22 RX ORDER — FUROSEMIDE 10 MG/ML
20 INJECTION INTRAMUSCULAR; INTRAVENOUS DAILY
Status: DISCONTINUED | OUTPATIENT
Start: 2019-11-23 | End: 2019-11-25

## 2019-11-22 RX ORDER — OMEPRAZOLE 20 MG/1
20 CAPSULE, DELAYED RELEASE ORAL DAILY
COMMUNITY
End: 2019-11-25 | Stop reason: HOSPADM

## 2019-11-22 RX ADMIN — CYANOCOBALAMIN TAB 500 MCG 1000 MCG: 500 TAB at 09:02

## 2019-11-22 RX ADMIN — CEPHALEXIN 250 MG: 250 CAPSULE ORAL at 11:41

## 2019-11-22 RX ADMIN — NYSTATIN 1 APPLICATION: 100000 CREAM TOPICAL at 17:42

## 2019-11-22 RX ADMIN — CEPHALEXIN 250 MG: 250 CAPSULE ORAL at 17:42

## 2019-11-22 RX ADMIN — CARBIDOPA AND LEVODOPA 1.5 TABLET: 25; 100 TABLET ORAL at 17:42

## 2019-11-22 RX ADMIN — FERROUS SULFATE TAB 325 MG (65 MG ELEMENTAL FE) 325 MG: 325 (65 FE) TAB at 09:02

## 2019-11-22 RX ADMIN — PANTOPRAZOLE SODIUM 40 MG: 40 INJECTION, POWDER, FOR SOLUTION INTRAVENOUS at 20:58

## 2019-11-22 RX ADMIN — PANTOPRAZOLE SODIUM 40 MG: 40 INJECTION, POWDER, FOR SOLUTION INTRAVENOUS at 09:04

## 2019-11-22 RX ADMIN — CARBIDOPA AND LEVODOPA 1.5 TABLET: 25; 100 TABLET ORAL at 09:01

## 2019-11-22 RX ADMIN — NYSTATIN: 100000 CREAM TOPICAL at 09:05

## 2019-11-22 RX ADMIN — CARBIDOPA AND LEVODOPA 1.5 TABLET: 25; 100 TABLET ORAL at 11:41

## 2019-11-22 RX ADMIN — IOHEXOL 100 ML: 350 INJECTION, SOLUTION INTRAVENOUS at 16:03

## 2019-11-22 RX ADMIN — CARBIDOPA AND LEVODOPA 1.5 TABLET: 25; 100 TABLET ORAL at 22:47

## 2019-11-22 RX ADMIN — CEPHALEXIN 250 MG: 250 CAPSULE ORAL at 05:37

## 2019-11-22 NOTE — ASSESSMENT & PLAN NOTE
· Patient had been admitted to [facility] on 05/28/2019 with right lower extremity cellulitis  · It does appear as if the patient's right lower extremity is reddened greater than left, is warm and tender to the touch  · Will start Keflex p o  As patient is having issues with IV access  · Bilateral lower extremity edema, elevate lower extremities, wrapped bilateral legs with Ace wraps from toes to knees

## 2019-11-22 NOTE — NURSING NOTE
Pt hemoglobin new result is 4 9  Still waiting for type and screen from [facility] due to pt antibodies  Hospitalist is aware  Will continue to monitor pt status with call bell and belongings within reach

## 2019-11-22 NOTE — PLAN OF CARE
Problem: Potential for Falls  Goal: Patient will remain free of falls  Description  INTERVENTIONS:  - Assess patient frequently for physical needs  -  Identify cognitive and physical deficits and behaviors that affect risk of falls    -  [place] fall precautions as indicated by assessment   - Educate patient/family on patient safety including physical limitations  - Instruct patient to call for assistance with activity based on assessment  - Modify environment to reduce risk of injury  - Consider OT/PT consult to assist with strengthening/mobility  Outcome: Progressing     Problem: INFECTION - ADULT  Goal: Absence or prevention of progression during hospitalization  Description  INTERVENTIONS:  - Assess and monitor for signs and symptoms of infection  - Monitor lab/diagnostic results  - Administer medications as ordered  - Instruct and encourage patient and family to use good hand hygiene technique  - Identify and instruct in appropriate isolation precautions for identified infection/condition   Outcome: Progressing  Goal: Absence of fever/infection during neutropenic period  Description  INTERVENTIONS:  - Monitor WBC    Outcome: Progressing     Problem: SAFETY ADULT  Goal: Maintain or return to baseline ADL function  Description  INTERVENTIONS:  -  Assess patient's ability to carry out ADLs; assess patient's baseline for ADL function and identify physical deficits which impact ability to perform ADLs (bathing, care of mouth/teeth, toileting, grooming, dressing, etc )  - Assess/evaluate cause of self-care deficits   - Assess range of motion  - Assess patient's mobility; develop plan if impaired  - Assess patient's need for assistive devices and provide as appropriate  - Encourage maximum independence but intervene and supervise when necessary  - Involve family in performance of ADLs  - Assess for home care needs following discharge   - Consider OT consult to assist with ADL evaluation and planning for discharge  - Provide patient education as appropriate  Outcome: Progressing  Goal: Maintain or return mobility status to optimal level  Description  INTERVENTIONS:  - Assess patient's baseline mobility status (ambulation, transfers, stairs, etc )    - Identify cognitive and physical deficits and behaviors that affect mobility  - Identify mobility aids required to assist with transfers and/or ambulation (gait belt, sit-to-stand, lift, walker, cane, etc )  - [place] fall precautions as indicated by assessment  - Record patient progress and toleration of activity level on Mobility SBAR; progress patient to next Phase/Stage  - Instruct patient to call for assistance with activity based on assessment  - Consider rehabilitation consult to assist with strengthening/weightbearing, etc   Outcome: Progressing     Problem: DISCHARGE PLANNING  Goal: Discharge to home or other facility with appropriate resources  Description  INTERVENTIONS:  - Identify barriers to discharge w/patient and caregiver  - Arrange for needed discharge resources and transportation as appropriate  - Identify discharge learning needs (meds, wound care, etc )  - Refer to Case Management Department for coordinating discharge planning if the patient needs post-hospital services based on physician/advanced practitioner order or complex needs related to functional status, cognitive ability, or social support system   Outcome: Progressing     Problem: HEMATOLOGIC - ADULT  Goal: Maintains hematologic stability  Description  INTERVENTIONS  - Assess for signs and symptoms of bleeding or hemorrhage  - Monitor labs  - Administer supportive blood products/factors as ordered and appropriate  Outcome: Progressing

## 2019-11-22 NOTE — H&P
H&P- Anamaria Barker 12/24/1932, 80 y o  female MRN: [id number]    Unit/Bed#: -02 Encounter: [id number]    Primary Care Provider: Miquel Andrews DO   Date and time admitted to hospital: 11/21/2019  7:19 PM        * Iron deficiency anemia secondary to inadequate dietary iron intake  Assessment & Plan  · Continue patient's iron supplementation along with B12 supplementation  · Patient has a baseline hemoglobin between 10 and 11  · Patient had outpatient blood work which showed hemoglobin of 6 0  · Patient was sent from [facility] to emergency department for this low hemoglobin  · In the emergency department her hemoglobin result was 6 0  · Transfusion blood work was partially obtained  · Patient was found to have significant antibodies  · Orders were placed to transfuse 2 units of packed red blood cells to the patient  · Patient had positive Hemoccult test in the emergency department  · Consult GI  · Give Protonix IV 40 mg b i d   · Heme test all stools  · Check hemoglobin every 8 hours  · Monitor on telemetry    Alzheimer's dementia without behavioral disturbance ([facility] [address] )  Assessment & Plan  · Patient with severe Alzheimer's dementia  · Patient's granddaughter states that the patient does forget that she is not ambulatory  · Fall precautions    Chronic diastolic congestive heart failure ([facility] [address] )  Assessment & Plan  Wt Readings from Last 3 Encounters:   11/21/19 80 3 kg (177 lb 0 5 oz)   06/03/19 81 kg (178 lb 9 2 oz)   11/22/18 72 6 kg (160 lb)     ·  Continue Lasix, cardiology recently decreased patient Lasix to 20 mg daily from 40 mg daily  · Cardiology also increased patient's metoprolol to 37 5 twice a day from 20/5 twice a day  · Patient does have chronic bilateral lower extremity edema, +2, nonpitting        Essential hypertension  Assessment & Plan  · Continue metoprolol and Lasix    Parkinson disease ([facility] [address] )  Assessment & Plan  · Continue Sinemet    Cellulitis of right lower extremity  Assessment & Plan  · Patient had been admitted to [facility] on 05/28/2019 with right lower extremity cellulitis  · It does appear as if the patient's right lower extremity is reddened greater than left, is warm and tender to the touch  · Will start Keflex p o  As patient is having issues with IV access  · Bilateral lower extremity edema, elevate lower extremities, wrapped bilateral legs with Ace wraps from toes to knees  VTE Prophylaxis: SCDs only as patient has acute bleed  Code Status:  Full code  POLST: POLST information provided but not completed - spoke to patient's granddaughter Christiano Martinez provided, granddaughter states she will review  Discussion with family:  Discussed with patient's granddaughter at the bedside    Anticipated Length of Stay:  Patient will be admitted on an Inpatient basis with an anticipated length of stay of  > 2 midnights  Justification for Hospital Stay:  Attempt to give patient blood transfusion, monitor hemoglobin    Total Time for Visit, including Counseling / Coordination of Care: 1 hour  Greater than 50% of this total time spent on direct patient counseling and coordination of care  Chief Complaint:   Low hemoglobin level    History of Present Illness: Dominguez Mesa is a 80 y o  female who presents with low hemoglobin level  On arrival to the emergency department blood work was obtained and showed a hemoglobin of 6 0  The patient is very pale, however the granddaughter who was at the bedside, states that she is normally this pale naturally  The patient did have a heme test performed which was positive  The patient does take chronic iron secondary to iron deficiency anemia  The patient does have significant Alzheimer's dementia and is unable to answer any questions, she will laugh and giggle when she is not able to answer question  THE PATIENT'S GRANDDAUGHTER IS AT THE BEDSIDE AND IS ANSWERING QUESTIONS      The patient's granddaughter did state that she had been receiving some IV fluids at [facility], upon reviewing documentation from the home, her hemoglobin was 5 6  Blood consent was obtained in the emergency department, however, patient was found to have numerous antibodies, which is prompting a longer wait for blood transfusion  It was initially believed by the ED physician that they would be able to administer and on crossed bag of packed red blood cells, this is currently in process  Upon further assessment, the patient does have a right lower extremity that does appear to be cellulitic in nature, the site is red, warm, and tender to touch  The patient had been admitted to [facility] in the end of April of this year and at that time had Ancef for right shin cellulitis  However, since the patient is having issues with IV access, I will start the patient on oral Keflex  We can wrap her bilateral lower extremities with Ace wraps from her toes to her knees loosely and elevate her bilateral lower extremities  Review of Systems:  Review of Systems   Unable to perform ROS: Dementia (Unable to perform secondary to patient's dementia)       Past Medical and Surgical History:   Past Medical History:   Diagnosis Date    Anemia     CAD (coronary artery disease)     CHF (congestive heart failure) ([address]     COPD (chronic obstructive pulmonary disease) ([address]     Dementia ([address]     Diverticulosis     Edema     Gout     Hypertension     Nuclear stress test 08/24/2012    Normal, EF 86%    Parkinson disease ([address] )        Past Surgical History:   Procedure Laterality Date    CARDIAC CATHETERIZATION  08/17/2009    20% stenosis in the proximal LAD, 40% stenosis mid LAD, 50% stenosis in D2, 40% stenosis in proximal RCA, 30% stenosis mid RCA and 40% stenosis distal RCA  Medical management       Meds/Allergies:  Prior to Admission medications    Medication Sig Start Date End Date Taking?  Authorizing Provider   acetaminophen (TYLENOL) 325 mg tablet Take 650 mg by mouth every 4 (four) hours as needed for mild pain     Historical Provider, MD   carbidopa-levodopa (SINEMET)  mg per tablet Take 1 5 tablets by mouth 4 (four) times a day     Historical Provider, MD   cyanocobalamin (VITAMIN B-12) 1,000 mcg tablet Take by mouth daily    Historical Provider, MD   ergocalciferol (VITAMIN D2) 50,000 units Take 50,000 Units by mouth once a week 5/29/19   Historical Provider, MD   ferrous sulfate 325 (65 Fe) mg tablet Take 1 tablet (325 mg total) by mouth daily with breakfast 6/1/19   Suzi Mendieta MD   furosemide (LASIX) 40 mg tablet Take 1 tablet (40 mg total) by mouth daily 6/19/19   Mora Fajardo PA-C   loperamide (IMODIUM) 2 mg capsule Take 2 mg by mouth 4 (four) times a day as needed for diarrhea    Historical Provider, MD   metoprolol tartrate (LOPRESSOR) 25 mg tablet Take 1 tablet (25 mg total) by mouth every 12 (twelve) hours 6/19/19   Mora Fajardo PA-C   nystatin (MYCOSTATIN) cream Apply topically 2 (two) times a day    Historical Provider, MD   simethicone (MYLICON) 80 mg chewable tablet Chew 80 mg every 6 (six) hours as needed for flatulence    Historical Provider, MD   triamcinolone (KENALOG) 0 1 % cream Apply topically 2 (two) times a day    Historical Provider, MD     I have reveiwed home medications using records provided by [facility]  Allergies: Allergies   Allergen Reactions    Sulfa Antibiotics        Social History:  Marital Status:    Occupation:  Retired  Patient Pre-hospital Living Situation:   At nursing home [facility]  Patient Pre-hospital Level of Mobility:  Bed ridden  Patient Pre-hospital Diet Restrictions:  Regular  Substance Use History:     Social History     Substance and Sexual Activity   Alcohol Use Never    Frequency: Never     Social History     Tobacco Use   Smoking Status Never Smoker   Smokeless Tobacco Never Used     Social History     Substance and Sexual Activity   Drug Use No       Family History:  I have reviewed the patients family history    Physical Exam:   Vitals:   Blood Pressure: 105/51 (11/21/19 2143)  Pulse: 77 (11/21/19 2143)  Temperature: 98 6 °F (37 °C) (11/21/19 2143)  Temp Source: Temporal (11/21/19 2143)  Respirations: 18 (11/21/19 2143)  Height: 5' (152 4 cm)(Stated) (11/21/19 2143)  Weight - Scale: 80 3 kg (177 lb 0 5 oz) (11/21/19 2235)  SpO2: 95 % (11/21/19 2143)    Physical Exam   Constitutional: Vital signs are normal  She appears well-developed and well-nourished  She is cooperative  Patient is pale, patient's granddaughter states she is normally very fair skinned  HENT:   Head: Normocephalic and atraumatic  Right Ear: Decreased hearing is noted  Left Ear: Decreased hearing is noted  Nose: Nose normal    Mouth/Throat: Oropharynx is clear and moist and mucous membranes are normal    Eyes: Conjunctivae and EOM are normal    Neck: Full passive range of motion without pain  Cardiovascular: Normal rate and normal pulses  An irregularly irregular rhythm present  Murmur heard  Systolic murmur is present with a grade of 2/6  Patient with +2 bilateral lower extremity edema   Pulmonary/Chest: Effort normal and breath sounds normal    Abdominal: Soft  Normal appearance and bowel sounds are normal    Musculoskeletal:   Patient usually is in wheelchair or in bed, patient has decreased mobility secondary to edema in her bilateral lower extremities  Neurological: She is alert  Patient has significant Alzheimer's dementia  Patient does know her birthday   Skin: Skin is warm and dry  Psychiatric: She has a normal mood and affect  Her speech is normal and behavior is normal    Patient with history of dementia  Additional Data:   Lab Results: I have personally reviewed pertinent reports        Results from last 7 days   Lab Units 11/21/19  1941   WBC Thousand/uL 11 50*   HEMOGLOBIN g/dL 6 0*   HEMATOCRIT % 18 9*   PLATELETS Thousands/uL 378   NEUTROS PCT % 70   LYMPHS PCT % 21   MONOS PCT % 8   EOS PCT % 1 Results from last 7 days   Lab Units 11/21/19 1941   POTASSIUM mmol/L 4 0   CHLORIDE mmol/L 99   CO2 mmol/L 29   BUN mg/dL 20   CREATININE mg/dL 0 66   CALCIUM mg/dL 8 2*   ALK PHOS U/L 106   ALT U/L <3*   AST U/L 9*     Results from last 7 days   Lab Units 11/21/19 1941   INR  1 88*               Imaging: I have personally reviewed pertinent reports  No orders to display       EKG, Pathology, and Other Studies Reviewed on Admission:   · EKG:  Not performed in the ED    [facility] Reviewed: Yes     ** Please Note: This note has been constructed using a voice recognition system   **

## 2019-11-22 NOTE — PLAN OF CARE
Problem: PHYSICAL THERAPY ADULT  Goal: Performs mobility at highest level of function for planned discharge setting  See evaluation for individualized goals  Description  Treatment/Interventions: Functional transfer training, LE strengthening/ROM, Therapeutic exercise, Endurance training, Cognitive reorientation, Bed mobility, Spoke to nursing  Equipment Recommended: (KAT at [facility])       See flowsheet documentation for full assessment, interventions and recommendations  Note:   Prognosis: Guarded  Problem List: Decreased strength, Decreased endurance, Impaired balance, Decreased mobility, Decreased cognition, Decreased safety awareness  Assessment: Pt is 80 y o  female seen for PT evaluation on 11/22/2019 s/p admit to [facility] on 11/21/2019 w/ Iron deficiency anemia secondary to inadequate dietary iron intake  PT consulted to assess pt's functional mobility and d/c needs  Order placed for PT eval and tx  Performed at least 2 patient identifiers during session: Name and wristband  Comorbidities affecting pt's physical performance at time of assessment include: cellulitis of RLE, dementia, CHF, HTN, and Parkinson disease  PTA, pt was long term resident of SNF  Personal factors affecting pt at time of IE include: unable to perform dynamic tasks in community, decreased cognition, decreased initiation and engagement, unable to perform physical activity, limited insight into impairments, inability to perform IADLs and inability to perform ADLs  Please find objective findings from PT assessment regarding body systems outlined above with impairments and limitations including weakness, impaired balance, decreased activity tolerance, decreased functional mobility tolerance, fall risk and decreased cognition, as well as mobility assessment (need for cueing for mobility technique)  The following objective measures performed on IE also reveal limitations: Barthel Index: 25/100   Pt's clinical presentation is currently unstable/unpredictable seen in pt's presentation of ongoing medical assessment  Pt to benefit from continued PT tx to address deficits as defined above and maximize level of functional independent mobility and consistency  From PT/mobility standpoint, recommendation at time of d/c would be return to SNF c PT services as indicated, pending progress in order to facilitate return to PLOF  Barriers to Discharge: Other (Comment)(pt resides at [facility])     Recommendation: Long-term skilled nursing home placement, 24 hour supervision/assist(return to SNF c PT services if indicated)     PT - OK to Discharge: Yes(when medically cleared)    See flowsheet documentation for full assessment

## 2019-11-22 NOTE — ASSESSMENT & PLAN NOTE
· Continue patient's iron supplementation along with B12 supplementation  · Patient has a baseline hemoglobin between 10 and 11  · Patient had outpatient blood work which showed hemoglobin of 5 6  · Patient was sent from [facility] to emergency department for this low hemoglobin  · In the emergency department her hemoglobin result was 6 0  · Transfusion blood work was partially obtained  · Patient was found to have significant antibodies  · Orders were placed to transfuse 2 units of packed red blood cells to the patient  · Patient had positive Hemoccult test in the emergency department  · Consult GI  · Give Protonix IV 40 mg b i d   · Heme test all stools  · Check hemoglobin every 8 hours  · Monitor on telemetry

## 2019-11-22 NOTE — ASSESSMENT & PLAN NOTE
· Patient had been admitted to [facility] on 05/28/2019 with right lower extremity cellulitis  · It does appear as if the patient's right lower extremity is reddened greater than left, is warm and tender to the touch  · Will start Keflex p o  As patient is having issues with IV access  · Bilateral lower extremity edema, elevate lower extremities, wrapped bilateral legs with Ace wraps from toes to knees

## 2019-11-22 NOTE — PROGRESS NOTES
Progress Note - Douglas Felix 12/24/1932, 80 y o  female MRN: [id number]    Unit/Bed#: -02 Encounter: [id number]    Primary Care Provider: Fer Flores DO   Date and time admitted to hospital: 11/21/2019  7:19 PM        * Acute blood loss anemia  Assessment & Plan  · Patient with acute blood loss anemia with suspected GI bleed with chronic iron deficiency anemia  · With suspected GI bleed  Patient with heme-positive stools  And acute anemia    · Rectal exam w/ dark stool and hemoccult  Positive  · Continue patient's iron supplementation along with B12 supplementation  · Patient has a baseline hemoglobin between 10 and 11; outpatient blood work which showed hemoglobin of 5 6, ER was 6 and currently 4 9  · Patient has multiple antibodies await blood from the blood bank  I spoke with the person in charge at the blood bank early this morning  · GI evaluated the patient spoke with family plan is to do the CT abdomen pelvis  Hold off on any EGD/colonoscopy procedures    · Patient's anticoagulation will be on hold, per record she was on Xarelto for a recent diagnosis of a DVT starting November 1st    Coagulopathy [facility])  Assessment & Plan  · INR 1 88  · Secondary to Xarelto on hold    Parkinson disease (HCC)  Assessment & Plan  · Continue Sinemet qid    Essential hypertension  Assessment & Plan  · Continue metoprolol and Lasix    Chronic diastolic congestive heart failure (HCC)  Assessment & Plan  Wt Readings from Last 3 Encounters:   11/22/19 81 6 kg (179 lb 14 3 oz)   06/03/19 81 kg (178 lb 9 2 oz)   11/22/18 72 6 kg (160 lb)     · Continue Lasix, cardiology recently decreased patient Lasix to 20 mg daily from 40 mg daily  · Cardiology also increased patient's metoprolol to 37 5 twice a day from 25 bid a day  · Patient does have chronic bilateral lower extremity edema, +2, nonpitting        Alzheimer's dementia without behavioral disturbance ([facility] [address] )  Assessment & Plan  · Patient with severe Alzheimer's dementia  · Fall precautions    Cellulitis of right lower extremity  Assessment & Plan  · Patient had been admitted to [facility] on 05/28/2019 with right lower extremity cellulitis  · It does appear as if the patient's right lower extremity is reddened greater than left, is warm and tender to the touch  · Will start Keflex p o  As patient is having issues with IV access  · Bilateral lower extremity edema, elevate lower extremities, wrapped bilateral legs with Ace wraps from toes to knees  VTE Pharmacologic Prophylaxis:  Vena dynes secondary to lower GI bleed and anemia    Patient Centered Rounds: I have performed bedside rounds with nursing staff today  Discussions with Specialists or Other Care Team Provider:  Case management, care coordination team, [facility] GI PAC, Blood Coordinator in Lab  Education and Discussions with Family / Patient: niece Pollie Meigs    Current Length of Stay: 1 day(s)    Current Patient Status: Inpatient   Certification Statement: The patient will continue to require additional inpatient hospital stay due to Await blood, await CT testing GI follow-up    Discharge Plan:     Code Status: Level 3 - DNAR and DNI    Subjective:   Patient not able to provide any details  She is able to follow some commands and drank for me and took a deep breath  Granddaughter Pollie Meigs was at bedside  We discussed blood transfusion at this time is pending secondary to antibodies  She reports patient never had any blood transfusions in the past   She spoke with GI earlier this morning and plan is to continue conservative measures and start with CT scan abdomen pelvis  Holding off on any procedures with EGD and colonoscopy  Pollie Meigs would like to wait for any more blood draws until after the blood is given  Also Discussed with Pollie Meigs code status and at this point they would not want any aggressive measures surgical procedures performed that we should make her DNR DNI    She is agreeable to noninvasive procedure with the CT and blood transfusion  She notes that patient be more agitated today I will discontinue the telemetry  Objective:     Vitals:   Temp (24hrs), Av 1 °F (36 7 °C), Min:97 5 °F (36 4 °C), Max:98 6 °F (37 °C)    Temp:  [97 5 °F (36 4 °C)-98 6 °F (37 °C)] 98 3 °F (36 8 °C)  HR:  [77-79] 77  Resp:  [18] 18  BP: ()/(51) 94/51  SpO2:  [95 %-98 %] 95 %  Body mass index is 35 13 kg/m²  Input and Output Summary (last 24 hours): Intake/Output Summary (Last 24 hours) at 2019 1604  Last data filed at 2019 0800  Gross per 24 hour   Intake 300 ml   Output --   Net 300 ml     Physical Exam:     Physical Exam   Constitutional: She appears well-developed and well-nourished  HENT:   Head: Normocephalic and atraumatic  Eyes: Conjunctivae and EOM are normal  Right eye exhibits no discharge  Left eye exhibits no discharge  Neck: Normal range of motion  No tracheal deviation present  Cardiovascular: Normal rate and regular rhythm  Exam reveals no gallop and no friction rub  Murmur heard  Pulmonary/Chest: Effort normal and breath sounds normal  No respiratory distress  She has no wheezes  She has no rales  Abdominal: Soft  Bowel sounds are normal  She exhibits no distension and no mass  There is no tenderness  There is no guarding  Musculoskeletal: Normal range of motion  She exhibits edema (2+ and pedal)  She exhibits no tenderness or deformity  Lower extremities in vena dynes   Neurological: She is alert  Skin: Skin is warm and dry  No rash noted  There is erythema (And warmth of bilateral lower extremities)  There is pallor  Psychiatric:   Baseline dementia   Nursing note and vitals reviewed        Additional Data:   Labs:    Results from last 7 days   Lab Units 19  1500 19  0508   WBC Thousand/uL  --  9 60   HEMOGLOBIN g/dL 5 6* 4 9*   HEMATOCRIT %  --  14 7*   PLATELETS Thousands/uL  --  288   NEUTROS PCT %  --  70   LYMPHS PCT %  --  22   MONOS PCT %  --  7   EOS PCT %  --  1     Results from last 7 days   Lab Units 11/22/19  0508   POTASSIUM mmol/L 4 0   CHLORIDE mmol/L 102   CO2 mmol/L 31   BUN mg/dL 20   CREATININE mg/dL 0 57*   CALCIUM mg/dL 7 7*   ALK PHOS U/L 84   ALT U/L <3*   AST U/L 12*     Results from last 7 days   Lab Units 11/21/19  1941   INR  1 88*     Results from last 7 days   Lab Units 11/22/19  1053 11/22/19  0713   POC GLUCOSE mg/dl 93 92     * I Have Reviewed All Lab Data Listed Above  * Additional Pertinent Lab Tests Reviewed: Cal 66 Admission  Reviewed    Imaging:  Imaging Reports Reviewed Today Include:  CT abdomen pelvis pending    Last 24 Hours Medication List:     Current Facility-Administered Medications:  acetaminophen 650 mg Oral Q6H PRN HELLEN [place]   carbidopa-levodopa 1 5 tablet Oral 4x Daily HELLEN [place]   cephalexin 250 mg Oral Q6H [address] HELLEN [place]   vitamin B-12 1,000 mcg Oral Daily HELLEN [place]   ferrous sulfate 325 mg Oral Daily With Breakfast HELLEN [place]   [START ON 11/23/2019] furosemide 20 mg Intravenous Daily Melinda Mensah PA-C   iohexol 50 mL Oral Once in [facility], MD   metoprolol tartrate 37 5 mg Oral Q12H [address] HELLEN [place]   nystatin  Topical BID HELLEN [place]   ondansetron 4 mg Intravenous Q6H PRN HELLEN [place]   pantoprazole 40 mg Intravenous Q12H [address] HELLEN [place]   simethicone 80 mg Oral Q6H PRN [employer] LaboratoriesHELLEN        Today, Patient Was Seen By: Huma Farias PA-C    ** Please Note: Dictation voice to text software may have been used in the creation of this document   **

## 2019-11-22 NOTE — CONSULTS
Consultation - Adolph Pozo 80 y o  female MRN: [id number]  Unit/Bed#: -02 Encounter: [id number]      Assessment/Plan     Assessment:  1  Anemia, iron deficiency  2  Heme positive stool  Plan:  The case was discussed with Dr Lavern Castrejon  The patient has dementia and a full ROS was unable to be obtained  I gathered information from the patient's chart and the patient's grand-daugther, Gen Vargas  The patient has a low hemoglobin level, currently 4 9, and had a heme positive stool in the ER  We discussed about possible causes, such as an active GI bleed  The patient's grand daughter did not want to proceed with an EGD and colonoscopy currently  She is going to discuss if this is something they want the patient to undergo with her other family members  Currently, will proceed with a CT scan of her abdomen and pelvis for further evaluation  Extensive education provided to the grand daughter  All of her questions were answered  History of Present Illness   Physician Requesting Consult: Jennette Severin, MD  Reason for Consult / Principal Problem: Anemia, iron deficiency  Hx and PE limited by:   HPI: Saritha Ashraf is a 80y o  year old female who presented to the ER with a hemoglobin of 5 9 after having blood work at the nursing home showing a hemoglobin of 6  She did have a heme occult that was positive in the ER however the patient does take iron chronically secondary to iron deficiency anemia  However per records it seems that her hemoglobin level is usually stable around a 10-11  The patient does have significant dementia and was unable to provide any information to me at the time of examination  Inpatient consult to gastroenterology  Consult performed by: Radha Dickson PA-C  Consult ordered by: HELLEN Laird          Review of Systems   Reason unable to perform ROS: The patient's dementia          Historical Information   Past Medical History:   Diagnosis Date    Anemia     CAD (coronary artery disease)     [facility]     COPD [facility]chronic obstructive pulmonary disease) [facility]     Dementia ([facility]     Diverticulosis     Edema     Gout     Hypertension     Nuclear stress test 08/24/2012    Normal, EF 86%    Parkinson disease ([facility] [address] )      Past Surgical History:   Procedure Laterality Date    CARDIAC CATHETERIZATION  08/17/2009    20% stenosis in the proximal LAD, 40% stenosis mid LAD, 50% stenosis in D2, 40% stenosis in proximal RCA, 30% stenosis mid RCA and 40% stenosis distal RCA  Medical management     Social History   Social History     Substance and Sexual Activity   Alcohol Use Never    Frequency: Never     Social History     Substance and Sexual Activity   Drug Use No     Social History     Tobacco Use   Smoking Status Never Smoker   Smokeless Tobacco Never Used     Family History: History reviewed  No pertinent family history  Meds/Allergies   all current active meds have been reviewed, current meds:   Current Facility-Administered Medications   Medication Dose Route Frequency    acetaminophen (TYLENOL) tablet 650 mg  650 mg Oral Q6H PRN    carbidopa-levodopa (SINEMET)  mg per tablet 1 5 tablet  1 5 tablet Oral 4x Daily    cephalexin (KEFLEX) capsule 250 mg  250 mg Oral Q6H [facility]    cyanocobalamin (VITAMIN B-12) tablet 1,000 mcg  1,000 mcg Oral Daily    ferrous sulfate tablet 325 mg  325 mg Oral Daily With Breakfast    furosemide (LASIX) tablet 20 mg  20 mg Oral Daily    metoprolol tartrate (LOPRESSOR) partial tablet 37 5 mg  37 5 mg Oral Q12H SHAAN    nystatin (MYCOSTATIN) cream   Topical BID    ondansetron (ZOFRAN) injection 4 mg  4 mg Intravenous Q6H PRN    pantoprazole (PROTONIX) injection 40 mg  40 mg Intravenous Q12H SHAAN    simethicone (MYLICON) chewable tablet 80 mg  80 mg Oral Q6H PRN    and PTA meds:   Prior to Admission Medications   Prescriptions Last Dose Informant Patient Reported?  Taking?   acetaminophen (TYLENOL) 325 mg tablet Unknown at Unknown time  Yes No   Sig: Take 650 mg by mouth every 4 (four) hours as needed for mild pain    carbidopa-levodopa (SINEMET)  mg per tablet 11/21/2019 at Unknown time  Yes Yes   Sig: Take 1 5 tablets by mouth 4 (four) times a day    cyanocobalamin (VITAMIN B-12) 1,000 mcg tablet 11/21/2019  Yes No   Sig: Take by mouth daily   ergocalciferol (VITAMIN D2) 50,000 units Past Week at Unknown time  Yes Yes   Sig: Take 50,000 Units by mouth once a week   ferrous sulfate 325 (65 Fe) mg tablet 11/21/2019 at Unknown time  No Yes   Sig: Take 1 tablet (325 mg total) by mouth daily with breakfast   furosemide (LASIX) 40 mg tablet 11/21/2019 at Unknown time  No Yes   Sig: Take 1 tablet (40 mg total) by mouth daily   loperamide (IMODIUM) 2 mg capsule   Yes No   Sig: Take 2 mg by mouth 4 (four) times a day as needed for diarrhea   metolazone (ZAROXOLYN) 2 5 mg tablet Past Month at Unknown time Outside Facility (Specify) Yes Yes   Sig: Take 2 5 mg by mouth daily   metoprolol tartrate (LOPRESSOR) 25 mg tablet 11/21/2019 at Unknown time  No Yes   Sig: Take 1 tablet (25 mg total) by mouth every 12 (twelve) hours   nystatin (MYCOSTATIN) cream Unknown at Unknown time  Yes No   Sig: Apply topically 2 (two) times a day   omeprazole (PriLOSEC) 20 mg delayed release capsule 11/21/2019 at Unknown time Outside Facility ([address] [place] [address]) Yes Yes   Sig: Take 20 mg by mouth daily   potassium chloride (KLOR-CON) 20 mEq packet 11/21/2019 at Unknown time Outside Facility ([address] [place] [address]) Yes Yes   Sig: Take 20 mEq by mouth 2 (two) times a day   rivaroxaban (XARELTO) 15 mg tablet 11/21/2019 at Unknown time Outside Facility ([address] [place] [address]) Yes Yes   Sig: Take 15 mg by mouth 2 (two) times a day   rivaroxaban (XARELTO) 20 mg tablet  Outside Facility (Specify) Yes Yes   Sig: Take 20 mg by mouth   simethicone (MYLICON) 80 mg chewable tablet Unknown at Unknown time  Yes No   Sig: Chew 80 mg every 6 (six) hours as needed for flatulence   triamcinolone (KENALOG) 0 1 % cream Unknown at Unknown time  Yes No   Sig: Apply topically 2 (two) times a day      Facility-Administered Medications: None       Allergies   Allergen Reactions    Sulfa Antibiotics        Objective     No intake or output data in the 24 hours ending 11/22/19 0827    Invasive Devices:   Peripheral IV 11/21/19 Right Antecubital (Active)   Site Assessment Clean;Dry; Intact 11/21/2019 11:08 PM   Dressing Type Transparent 11/21/2019 11:08 PM   Line Status Flushed 11/21/2019 11:08 PM   Dressing Status Clean;Dry; Intact 11/21/2019 11:08 PM     Vitals:    11/22/19 0734   BP: 94/51   Pulse: 77   Resp: 18   Temp: 98 3 °F (36 8 °C)   SpO2: 95%       Physical Exam   Constitutional: She appears well-developed and well-nourished  Pale  HENT:   Head: Normocephalic and atraumatic  Neck: Neck supple  Cardiovascular: Normal rate  Pulmonary/Chest: Effort normal and breath sounds normal    Abdominal: Soft  Bowel sounds are normal  She exhibits no distension and no mass  There is no tenderness  There is no rebound and no guarding  No hernia  Neurological: She is alert  Nursing note and vitals reviewed  Lab Results: I have personally reviewed pertinent reports  Imaging Studies: I have personally reviewed pertinent reports  VTE Prophylaxis: Sequential compression device (Venodyne)     Counseling / Coordination of Care  Total floor / unit time spent today 70 minutes  Greater than 50% of total time was spent with the patient and / or family counseling and / or coordination of care  A description of the counseling / coordination of care: direct patient care, review of patient's chart, discussion with hospital staff, discussion with supervising physician, and discussion with family

## 2019-11-22 NOTE — ASSESSMENT & PLAN NOTE
Wt Readings from Last 3 Encounters:   11/21/19 80 3 kg (177 lb 0 5 oz)   06/03/19 81 kg (178 lb 9 2 oz)   11/22/18 72 6 kg (160 lb)     ·  Continue Lasix, cardiology recently decreased patient Lasix to 20 mg daily from 40 mg daily  · Cardiology also increased patient's metoprolol to 37 5 twice a day from 20/5 twice a day  · Patient does have chronic bilateral lower extremity edema, +2, nonpitting

## 2019-11-22 NOTE — ED PROVIDER NOTES
History  Chief Complaint   Patient presents with    Abnormal Lab     low Hemoglobin     HPI    Patient is a pleasant 80year old female who presents with anemia  Patient with reported dementia, unable to obtain an accurate ROS or history from the patient  However, she denies any current pain  Rectal exam with dark hemocult positive stools  No abdominal tenderness  No acute distress although she does appear pale  MDM 80 yof, anemia, concern for GI bleeding  Prior to Admission Medications   Prescriptions Last Dose Informant Patient Reported?  Taking?   acetaminophen (TYLENOL) 325 mg tablet Unknown at Unknown time  Yes No   Sig: Take 650 mg by mouth every 4 (four) hours as needed for mild pain    carbidopa-levodopa (SINEMET)  mg per tablet 11/21/2019 at Unknown time  Yes Yes   Sig: Take 1 5 tablets by mouth 4 (four) times a day    cyanocobalamin (VITAMIN B-12) 1,000 mcg tablet 11/21/2019  Yes No   Sig: Take by mouth daily   ergocalciferol (VITAMIN D2) 50,000 units Past Week at Unknown time  Yes Yes   Sig: Take 50,000 Units by mouth once a week   ferrous sulfate 325 (65 Fe) mg tablet 11/21/2019 at Unknown time  No Yes   Sig: Take 1 tablet (325 mg total) by mouth daily with breakfast   furosemide (LASIX) 40 mg tablet 11/21/2019 at Unknown time  No Yes   Sig: Take 1 tablet (40 mg total) by mouth daily   loperamide (IMODIUM) 2 mg capsule   Yes No   Sig: Take 2 mg by mouth 4 (four) times a day as needed for diarrhea   metolazone (ZAROXOLYN) 2 5 mg tablet Past Month at Unknown time Outside Facility (Specify) Yes Yes   Sig: Take 2 5 mg by mouth daily   metoprolol tartrate (LOPRESSOR) 25 mg tablet 11/21/2019 at Unknown time  No Yes   Sig: Take 1 tablet (25 mg total) by mouth every 12 (twelve) hours   nystatin (MYCOSTATIN) cream Unknown at Unknown time  Yes No   Sig: Apply topically 2 (two) times a day   omeprazole (PriLOSEC) 20 mg delayed release capsule 11/21/2019 at Unknown time Outside Facility (Specify) Yes Yes   Sig: Take 20 mg by mouth daily   potassium chloride (KLOR-CON) 20 mEq packet 11/21/2019 at Unknown time Outside Facility ([address] [place] [address]) Yes Yes   Sig: Take 20 mEq by mouth 2 (two) times a day   rivaroxaban (XARELTO) 15 mg tablet 11/21/2019 at Unknown time Outside Facility ([address] [place] [address]) Yes Yes   Sig: Take 15 mg by mouth 2 (two) times a day   rivaroxaban (XARELTO) 20 mg tablet  Outside Facility (Specify) Yes Yes   Sig: Take 20 mg by mouth   simethicone (MYLICON) 80 mg chewable tablet Unknown at Unknown time  Yes No   Sig: Chew 80 mg every 6 (six) hours as needed for flatulence   triamcinolone (KENALOG) 0 1 % cream Unknown at Unknown time  Yes No   Sig: Apply topically 2 (two) times a day      Facility-Administered Medications: None       Past Medical History:   Diagnosis Date    Anemia     CAD (coronary artery disease)     [facility]est[facility]     COPD [facility]chronic obstructive pulmonary disease) [facility] [address] [facility]     Dementia ([facility] )     Diverticulosis     Edema     Gout     Hypertension     Nuclear stress test 08/24/2012    Normal, EF 86%    Parkinson disease ([facility] [address] )        Past Surgical History:   Procedure Laterality Date    CARDIAC CATHETERIZATION  08/17/2009    20% stenosis in the proximal LAD, 40% stenosis mid LAD, 50% stenosis in D2, 40% stenosis in proximal RCA, 30% stenosis mid RCA and 40% stenosis distal RCA  Medical management       History reviewed  No pertinent family history  I have reviewed and agree with the history as documented  Social History     Tobacco Use    Smoking status: Never Smoker    Smokeless tobacco: Never Used   Substance Use Topics    Alcohol use: Never     Frequency: Never    Drug use: No        Review of Systems   Unable to perform ROS: Dementia   All other systems reviewed and are negative  Physical Exam  Physical Exam   Constitutional: She is oriented to person, place, and time  She appears well-developed and well-nourished     HENT:   Head: Normocephalic and atraumatic  Right Ear: External ear normal    Left Ear: External ear normal    Eyes: Conjunctivae and EOM are normal    Neck: Normal range of motion  Neck supple  No JVD present  No tracheal deviation present  Cardiovascular: Normal rate, regular rhythm and normal heart sounds  Pulmonary/Chest: Effort normal  No respiratory distress  She has no wheezes  She has no rales  Abdominal: Soft  Bowel sounds are normal  There is no tenderness  There is no rebound and no guarding  Musculoskeletal: She exhibits no edema or tenderness  Neurological: She is alert and oriented to person, place, and time  Skin: Skin is warm and dry  No rash noted  No erythema  Psychiatric: She has a normal mood and affect  Thought content normal    Nursing note and vitals reviewed        Vital Signs  ED Triage Vitals   Temperature Pulse Respirations Blood Pressure SpO2   11/21/19 2143 11/21/19 2143 11/21/19 2143 11/21/19 2143 11/21/19 2143   98 6 °F (37 °C) 77 18 105/51 95 %      Temp Source Heart Rate Source Patient Position - Orthostatic VS BP Location FiO2 (%)   11/21/19 2143 11/21/19 2143 11/21/19 2143 11/21/19 2143 --   Temporal Monitor Lying Left arm       Pain Score       11/21/19 2249       No Pain           Vitals:    11/23/19 0826 11/23/19 1522 11/23/19 2128 11/23/19 2242   BP: 122/64 120/52 94/50 115/59   Pulse: 68 62 68 71   Patient Position - Orthostatic VS:  Lying Sitting Lying         Visual Acuity      ED Medications  Medications   carbidopa-levodopa (SINEMET)  mg per tablet 1 5 tablet (1 5 tablets Oral Given 11/23/19 2126)   cyanocobalamin (VITAMIN B-12) tablet 1,000 mcg (1,000 mcg Oral Given 11/23/19 1007)   ferrous sulfate tablet 325 mg (325 mg Oral Given 11/23/19 1006)   nystatin (MYCOSTATIN) cream ( Topical Given 11/23/19 1821)   simethicone (MYLICON) chewable tablet 80 mg (has no administration in time range)   ondansetron (ZOFRAN) injection 4 mg (has no administration in time range) acetaminophen (TYLENOL) tablet 650 mg (has no administration in time range)   pantoprazole (PROTONIX) injection 40 mg (40 mg Intravenous Given 11/23/19 2125)   cephalexin (KEFLEX) capsule 250 mg (250 mg Oral Given 11/24/19 0615)   metoprolol tartrate (LOPRESSOR) partial tablet 37 5 mg (37 5 mg Oral Not Given 11/23/19 2135)   iohexol (OMNIPAQUE) 240 MG/ML solution 50 mL (has no administration in time range)   furosemide (LASIX) injection 20 mg (20 mg Intravenous Given 11/23/19 1007)   iohexol (OMNIPAQUE) 350 MG/ML injection (MULTI-DOSE) 100 mL (100 mL Intravenous Given 11/22/19 1603)       Diagnostic Studies  Results Reviewed     Procedure Component Value Units Date/Time    UA w Reflex to Microscopic w Reflex to Culture [[id number]] Collected:  11/24/19 0619    Lab Status:  No result Specimen:  Urine, Other     Troponin I [[id number]]  (Normal) Collected:  11/21/19 1941    Lab Status:  Final result Specimen:  Blood from Arm, Right Updated:  11/21/19 2009     Troponin I <0 03 ng/mL     Comprehensive metabolic panel [[id number]]  (Abnormal) Collected:  11/21/19 1941    Lab Status:  Final result Specimen:  Blood from Arm, Right Updated:  11/21/19 2009     Sodium 135 mmol/L      Potassium 4 0 mmol/L      Chloride 99 mmol/L      CO2 29 mmol/L      ANION GAP 7 mmol/L      BUN 20 mg/dL      Creatinine 0 66 mg/dL      Glucose 100 mg/dL      Calcium 8 2 mg/dL      AST 9 U/L      ALT <3 U/L      Alkaline Phosphatase 106 U/L      Total Protein 6 1 g/dL      Albumin 3 0 g/dL      Total Bilirubin 0 30 mg/dL      eGFR 80 ml/min/1 73sq m     Narrative:       Charly guidelines for Chronic Kidney Disease (CKD):     Stage 1 with normal or high GFR (GFR > 90 mL/min/1 73 square meters)    Stage 2 Mild CKD (GFR = 60-89 mL/min/1 73 square meters)    Stage 3A Moderate CKD (GFR = 45-59 mL/min/1 73 square meters)    Stage 3B Moderate CKD (GFR = 30-44 mL/min/1 73 square meters)    Stage 4 Severe CKD (GFR = 15-29 mL/min/1 73 square meters)    Stage 5 End Stage CKD (GFR <15 mL/min/1 73 square meters)  Note: GFR calculation is accurate only with a steady state creatinine    Protime-INR [[id number]]  (Abnormal) Collected:  11/21/19 1941    Lab Status:  Final result Specimen:  Blood from Arm, Right Updated:  11/21/19 1958     Protime 22 0 seconds      INR 1 88    APTT [[id number]]  (Abnormal) Collected:  11/21/19 1941    Lab Status:  Final result Specimen:  Blood from Arm, Right Updated:  11/21/19 1958     PTT 46 seconds     CBC and differential [[id number]]  (Abnormal) Collected:  11/21/19 1941    Lab Status:  Final result Specimen:  Blood from Arm, Right Updated:  11/21/19 1957     WBC 11 50 Thousand/uL      RBC 1 84 Million/uL      Hemoglobin 6 0 g/dL      Hematocrit 18 9 %       fL      MCH 32 4 pg      MCHC 31 6 g/dL      RDW 21 5 %      MPV 7 4 fL      Platelets 843 Thousands/uL      Neutrophils Relative 70 %      Lymphocytes Relative 21 %      Monocytes Relative 8 %      Eosinophils Relative 1 %      Basophils Relative 0 %      Neutrophils Absolute 8 10 Thousands/µL      Lymphocytes Absolute 2 40 Thousands/µL      Monocytes Absolute 1 00 Thousand/µL      Eosinophils Absolute 0 10 Thousand/µL      Basophils Absolute 0 00 Thousands/µL                  CT abdomen pelvis w contrast   Final Result by Maureen Sterling MD (11/22 1646)      1  There is a large irregularly shaped lobular mass involving the stomach highly concerning for malignancy  Differential includes primary gastric carcinoma lymphoma, or metastatic disease from elsewhere  2  Upper abdominal adenopathy most concerning for metastatic lymphadenopathy     3  DVT noted within the right common femoral vein, right external iliac vein, and distal portion of the right common iliac vein          I personally discussed this study with Dr Miles De La Garza on 11/22/2019 at 4:45 PM                       Workstation performed: [id number] Procedures  Procedures       ED Course  ED Course as of Nov 24 0659   Thu Nov 21, 2019 2011 Redraw type and screen - 3 plain reds 2 purples      2103 Of note, discussed with blood bank and jocelyn duke, although patient has minor blood group antigen, she has a + hemocult, concern for ongoing GI bleed, will transfuse  MDM    Disposition  Final diagnoses:   Anemia     Time reflects when diagnosis was documented in both MDM as applicable and the Disposition within this note     Time User Action Codes Description Comment    11/21/2019  8:37 PM Gayatri Khan, [address] [D64 9] Anemia     11/21/2019  8:50 PM Abrahan Panchal Modify [D64 9] Anemia       ED Disposition     ED Disposition Condition Date/Time Comment    Admit Stable Thu Nov 21, 2019  8:37 PM Case was discussed with aleksandr and the patient's admission status was agreed to be Admission Status: inpatient status to the service of Dr Светлана Lipscomb   Follow-up Information    None         Current Discharge Medication List      CONTINUE these medications which have NOT CHANGED    Details   carbidopa-levodopa (SINEMET)  mg per tablet Take 1 5 tablets by mouth 4 (four) times a day       ergocalciferol (VITAMIN D2) 50,000 units Take 50,000 Units by mouth once a week      ferrous sulfate 325 (65 Fe) mg tablet Take 1 tablet (325 mg total) by mouth daily with breakfast  Refills: 0    Associated Diagnoses: Iron deficiency      furosemide (LASIX) 40 mg tablet Take 1 tablet (40 mg total) by mouth daily  Qty: 30 tablet, Refills: 3    Associated Diagnoses: Chronic diastolic congestive heart failure ([address] ); Essential hypertension; Cardiac murmur; CHF exacerbation ([facility])      metolazone (ZAROXOLYN) 2 5 mg tablet Take 2 5 mg by mouth daily      metoprolol tartrate (LOPRESSOR) 25 mg tablet Take 1 tablet (25 mg total) by mouth every 12 (twelve) hours  Qty: 60 tablet, Refills: 0    Associated Diagnoses: CHF exacerbation ([address] );  Chronic diastolic congestive heart failure ([facility] [address] ); Essential hypertension; Cardiac murmur      omeprazole (PriLOSEC) 20 mg delayed release capsule Take 20 mg by mouth daily      potassium chloride (KLOR-CON) 20 mEq packet Take 20 mEq by mouth 2 (two) times a day      !! rivaroxaban (XARELTO) 15 mg tablet Take 15 mg by mouth 2 (two) times a day      !! rivaroxaban (XARELTO) 20 mg tablet Take 20 mg by mouth      acetaminophen (TYLENOL) 325 mg tablet Take 650 mg by mouth every 4 (four) hours as needed for mild pain       cyanocobalamin (VITAMIN B-12) 1,000 mcg tablet Take by mouth daily      loperamide (IMODIUM) 2 mg capsule Take 2 mg by mouth 4 (four) times a day as needed for diarrhea      nystatin (MYCOSTATIN) cream Apply topically 2 (two) times a day      simethicone (MYLICON) 80 mg chewable tablet Chew 80 mg every 6 (six) hours as needed for flatulence      triamcinolone (KENALOG) 0 1 % cream Apply topically 2 (two) times a day       !! - Potential duplicate medications found  Please discuss with provider  No discharge procedures on file      ED Provider  Electronically Signed by           Meghann Kerr MD  11/24/19 0754

## 2019-11-22 NOTE — ASSESSMENT & PLAN NOTE
· Patient with acute blood loss anemia with suspected GI bleed with chronic iron deficiency anemia  · With suspected GI bleed  Patient with heme-positive stools  And acute anemia    · Rectal exam w/ dark stool and hemoccult  Positive  · Continue patient's iron supplementation along with B12 supplementation  · Patient has a baseline hemoglobin between 10 and 11; outpatient blood work which showed hemoglobin of 5 6, ER was 6 and currently 4 9  · Patient has multiple antibodies await blood from the blood bank  I spoke with the person in charge at the blood bank early this morning  · GI evaluated the patient spoke with family plan is to do the CT abdomen pelvis  Hold off on any EGD/colonoscopy procedures    · Patient's anticoagulation will be on hold, per record she was on Xarelto for a recent diagnosis of a DVT starting November 1st

## 2019-11-22 NOTE — SOCIAL WORK
Visit with patient in her hospital room  Patient confused and unable to answer questions  Phoned [facility] and Maya  Patient is a bed hold and will return on discharge  Attempt made to contact son and granddaughter without result

## 2019-11-22 NOTE — PLAN OF CARE
Problem: OCCUPATIONAL THERAPY ADULT  Goal: Performs self-care activities at highest level of function for planned discharge setting  See evaluation for individualized goals  Description  Treatment Interventions: ADL retraining, Functional transfer training, UE strengthening/ROM, Endurance training, Cognitive reorientation, Compensatory technique education, Activityengagement          See flowsheet documentation for full assessment, interventions and recommendations  Note:   Limitation: Decreased ADL status, Decreased UE strength, Decreased Safe judgement during ADL, Decreased cognition, Decreased endurance, Decreased self-care trans, Decreased high-level ADLs  Prognosis: Fair  Assessment: Pt is a 80 y o  female who was admitted to [address] [place] [address] on 11/21/2019 with Iron deficiency anemia secondary to inadequate dietary iron intake  At this time, patient is also affected by the comorbidities of: cellulitis of RLE, dementia, CHF, HTN, and parkinson disease  Additionally, patient is affected by the following personal factors:difficulty performing ADLS and difficulty performing IADLS   Prior to admission, Patient unable to provide PLOF, all information obtained from chart review  Will discuss with CM  Patient lives at [facility]  Upon OT evaluation, patient requires maximum assist for UB ADLs and total assist for LB ADLs  Occupational performance is affected by the following deficits: decreased ROM, decreased strength, decreased balance, decreased tolerance, impaired arousal, impaired attention, impaired initiation, impaired memory, impaired sequencing, impaired problem solving and decreased safety awareness  Based on the following information, patient would benefit from continued skilled OT treatment while in the hospital to address deficits and maximize level of functional independence with ADL's and functional mobility   Occupational Performance areas to address include: grooming, bathing/shower, toilet hygiene, dressing, functional mobility and clothing management  From OT standpoint, recommendation at time of d/c would be return to SNF       OT Discharge Recommendation: Other (Comment)(Return to SNF)  OT - OK to Discharge: Yes(Once medically cleared)     Jennifer Fox OT

## 2019-11-22 NOTE — ASSESSMENT & PLAN NOTE
· Patient with severe Alzheimer's dementia  · Patient's granddaughter states that the patient does forget that she is not ambulatory  · Fall precautions

## 2019-11-22 NOTE — ASSESSMENT & PLAN NOTE
Wt Readings from Last 3 Encounters:   11/22/19 81 6 kg (179 lb 14 3 oz)   06/03/19 81 kg (178 lb 9 2 oz)   11/22/18 72 6 kg (160 lb)     · Continue Lasix, cardiology recently decreased patient Lasix to 20 mg daily from 40 mg daily  · Cardiology also increased patient's metoprolol to 37 5 twice a day from 25 bid a day  · Patient does have chronic bilateral lower extremity edema, +2, nonpitting

## 2019-11-22 NOTE — PHYSICAL THERAPY NOTE
Physical Therapy Evaluation     Patient's Name: Pierre Rhoades    Admitting Diagnosis  Anemia [D64 9]  Low hemoglobin [D64 9]    Problem List  Patient Active Problem List   Diagnosis    Cellulitis of right lower extremity    Alzheimer's dementia without behavioral disturbance (HCC)    Chronic diastolic [facility]ive [facility] failure ([facility]    Essential hypertension    Mixed hyperlipidemia    Cardiac murmur    Non-rheumatic mitral regurgitation    Nonrheumatic aortic valve stenosis    Parkinson disease ([facility]    COPD (chronic obstructive pulmonary disease) ([facility]    Iron deficiency anemia secondary to inadequate dietary iron intake       Past Medical History  Past Medical History:   Diagnosis Date    Anemia     CAD (coronary artery disease)     CHF (congestive heart failure) ([facility]     COPD (chronic obstructive pulmonary disease) ([facility]     Dementia ([facility]     Diverticulosis     Edema     Gout     Hypertension     Nuclear stress test 08/24/2012    Normal, EF 86%    Parkinson disease ([facility]        Past Surgical History  Past Surgical History:   Procedure Laterality Date    CARDIAC CATHETERIZATION  08/17/2009    20% stenosis in the proximal LAD, 40% stenosis mid LAD, 50% stenosis in D2, 40% stenosis in proximal RCA, 30% stenosis mid RCA and 40% stenosis distal RCA   Medical management          11/22/19 0834   Note Type   Note type Eval/Treat   Pain Assessment   Pain Assessment FLACC   Pain Score No Pain   Pain Rating: FLACC (Rest) - Face 0   Pain Rating: FLACC (Rest) - Legs 0   Pain Rating: FLACC (Rest) - Activity 0   Pain Rating: FLACC (Rest) - Cry 0   Pain Rating: FLACC (Rest) - Consolability 0   Score: FLACC (Rest) 0   Pain Rating: FLACC (Activity) - Face 0   Pain Rating: FLACC (Activity) - Legs 0   Pain Rating: FLACC (Activity) - Activity 0   Pain Rating: FLACC (Activity) - Cry 0   Pain Rating: FLACC (Activity) - Consolability 0   Score: FLACC (Activity) 0   Home Living   Type of Home [facility] Layout One level;Performs ADLs on one level; Able to live on main level with bedroom/bathroom; Access   Plored Walk-in shower   Bathroom Toilet Raised   Bathroom Equipment Grab bars in shower; Shower chair;Grab bars around toilet   [facility]; Wheelchair-manual   Prior Function   Level of [place] Needs assistance with ADLs and functional mobility; Needs assistance with IADLs   Lives With Facility staff   Receives Help From Personal care attendant   ADL Assistance Needs assistance   IADLs Needs assistance   Falls in the last 6 months   (Unknown )   Comments 2/2 baseline dementia, patient unable to provide PLOF, all information obtained from chart review  Will discuss with CM,  Patient [facility] at [facility]  Restrictions/Precautions   Weight Bearing Precautions Per Order No   Other Precautions Cognitive; Chair Alarm; Fall Risk;Hard of hearing   General   Family/Caregiver Present No   Cognition   Overall Cognitive Status Impaired   Arousal/Participation Cooperative   Attention Difficulty attending to directions   Orientation Level Oriented to person;Disoriented to place; Disoriented to time;Disoriented to situation   Memory Decreased long term memory;Decreased recall of biographical information;Decreased short term memory;Decreased recall of recent events   Following Commands Follows one step commands inconsistently   RLE Assessment   RLE Assessment X   Strength RLE   R Hip Flexion 3-/5   R Knee Extension 3-/5   LLE Assessment   LLE Assessment X   Strength LLE   L Hip Flexion 3-/5   L Knee Extension 3-/5   Coordination   Movements are Fluid and Coordinated 0   Sensation   (unable to formally assess 2/2 baseline dementia)   Bed Mobility   Supine to Sit Unable to assess   Additional Comments Pt OOB and seated in brenda chair prior to eval and at end of session   Transfers   Sit to Stand Unable to assess   Additional Comments Functional mobility was deferred at this time 2/2 safety concerns and low hemoglobin of 4 9  Per PCA u[facility]ad [place] pt completed stand-pivot transfer from bed to brenda chair with hand-hold assistance of two people  Will follow and assess as medically appropriate  Ambulation/Elevation   Gait pattern Not tested   Balance   Static Sitting Fair  (in supported position in chair)   Activity Tolerance   Activity Tolerance Patient limited by fatigue;Treatment limited secondary to medical complications (Comment)  (baseline dementia, low HgB)   Nurse Made Aware AKHIL Bal made aware of outcomes    Assessment   Prognosis Guarded   Problem List Decreased strength;Decreased endurance; Impaired balance;Decreased mobility; Decreased cognition;Decreased safety awareness   Assessment Pt is 80 y o  female seen for PT evaluation on 11/22/2019 s/p admit to [employer] on 11/21/2019 w/ Iron deficiency anemia secondary to inadequate dietary iron intake  PT consulted to assess pt's functional mobility and d/c needs  Order placed for PT eval and tx  Performed at least 2 patient identifiers during session: Name and wristband  Comorbidities affecting pt's physical performance at time of assessment include: cellulitis of RLE, dementia, CHF, HTN, and Parkinson disease  PTA, pt was long term resident of SNF  Personal factors affecting pt at time of IE include: unable to perform dynamic tasks in community, decreased cognition, decreased initiation and engagement, unable to perform physical activity, limited insight into impairments, inability to perform IADLs and inability to perform ADLs  Please find objective findings from PT assessment regarding body systems outlined above with impairments and limitations including weakness, impaired balance, decreased activity tolerance, decreased functional mobility tolerance, fall risk and decreased cognition, as well as mobility assessment (need for cueing for mobility technique)   The following objective measures performed on IE also reveal limitations: Barthel Index: 25/100  Pt's clinical presentation is currently unstable/unpredictable seen in pt's presentation of ongoing medical assessment  Pt to benefit from continued PT tx to address deficits as defined above and maximize level of functional independent mobility and consistency  From PT/mobility standpoint, recommendation at time of d/c would be return to SNF c PT services as indicated, pending progress in order to facilitate return to PLOF  Barriers to Discharge Other (Comment)  (pt resides at [facility])   Goals   Patient Goals no goals expressed at this time 2/2 decreased cognition    STG Expiration Date 12/02/19   Short Term Goal #1 In 7-10 days: Increase bilateral LE strength 1/2 grade to facilitate independent mobility, Perform all bed mobility tasks with min A of 1 to decrease caregiver burden, Perform all transfers with min A of 1 to improve independence, Increase all balance 1/2 grade to decrease risk for falls, Tolerate 4 hr OOB to faciliate upright tolerance, Complete % of the time, PT provider will perform functional balance assessment to determine fall risk and PT to see and establish goals for ambulation when appropriate   PT Treatment Day 0   Plan   Treatment/Interventions Functional transfer training;LE strengthening/ROM; Therapeutic exercise; Endurance training;Cognitive reorientation; Bed mobility;Spoke to nursing   PT Frequency 2-3x/wk   Recommendation   Recommendation Long-term skilled nursing home placement;24 hour supervision/assist  (return to SNF c PT services if indicated)   Equipment Recommended   (TBD at SNF)   PT - OK to Discharge Yes  (when medically cleared)   Barthel Index   Feeding 5   Bathing 0   Grooming Score 0   Dressing Score 0   Bladder Score 5   Bowels Score 5   Toilet Use Score 5   Transfers (Bed/Chair) Score 5   Mobility (Level Surface) Score 0   Stairs Score 0   Barthel Index Score 25         Boston Edwrad, PT

## 2019-11-22 NOTE — OCCUPATIONAL THERAPY NOTE
Occupational Therapy Evaluation      [facility]    11/22/2019    Patient Active Problem List   Diagnosis    Cellulitis of right lower extremity    Alzheimer's dementia without behavioral disturbance (HCC)    Chronic diastolic congestive heart failure (HCC)    Essential hypertension    Mixed hyperlipidemia    Cardiac murmur    Non-rheumatic mitral regurgitation    Nonrheumatic aortic valve stenosis    [facility]    COPD (chronic obstructive pulmonary disease) (HCC)    Iron deficiency anemia secondary to inadequate dietary iron intake       Past Medical History:   Diagnosis Date    Anemia     CAD (coronary artery disease)     [facility]est[facility] failure) ([facility] )     COPD (chronic obstructive pulmonary disease) ([facility] )     Dementia ([facility] )     Diverticulosis     Edema     Gout     Hypertension     Nuclear stress test 08/24/2012    Normal, EF 86%    Parkinson disease ([facility] )        Past Surgical History:   Procedure Laterality Date    CARDIAC CATHETERIZATION  08/17/2009    20% stenosis in the proximal LAD, 40% stenosis mid LAD, 50% stenosis in D2, 40% stenosis in proximal RCA, 30% stenosis mid RCA and 40% stenosis distal RCA  Medical management        11/22/19 0835   Note Type   Note type Eval only   Restrictions/Precautions   Weight Bearing Precautions Per Order No   Other Precautions Cognitive;Multiple lines; Chair Alarm; Fall Risk;Hard of hearing   Pain Assessment   Pain Assessment FLACC   Pain Score No Pain   Pain Rating: FLACC (Rest) - Face 0   Pain Rating: FLACC (Rest) - Legs 0   Pain Rating: FLACC (Rest) - Activity 0   Pain Rating: FLACC (Rest) - Cry 0   Pain Rating: FLACC (Rest) - Consolability 0   Score: FLACC (Rest) 0   Pain Rating: FLACC (Activity) - Face 0   Pain Rating: FLACC (Activity) - Legs 0   Pain Rating: FLACC (Activity) - Activity 0   Pain Rating: FLACC (Activity) - Cry 0   Pain Rating: FLACC (Activity) - Consolability 0   Score: FLACC (Activity) 0   Home Living Type of Home SNF  [facility])   Home Layout One level;Performs ADLs on one level; Able to live on main level with bedroom/bathroom; Access   [place] Walk-in shower   Bathroom Toilet Raised   Bathroom Equipment Grab bars in shower; Shower chair;Grab bars around toilet   [address]   Prior Function   Level of [place] Needs assistance with ADLs and functional mobility; Needs assistance with IADLs   Lives With Facility staff   Receives Help From Personal care attendant   ADL Assistance Needs assistance   IADLs Needs assistance   Falls in the last 6 months   (Unknown )   Comments Patient unable to provide PLOF, all information obtained from chart review  Will discuss with CM,  Patient lives at [facility] [place]  ADL   Eating Assistance 5  Supervision/Setup   Grooming Assistance 4  Minimal Assistance   UB Bathing Assistance 2  Maximal Assistance   LB Bathing Assistance 1  Total Assistance   UB Dressing Assistance 2  Maximal Assistance   LB Dressing Assistance 1  Total [address]  1  Total Assistance   Bed Mobility   Additional Comments Pt OOB and seated in brenda chair prior to eval and at end of session    Transfers   Additional Comments Functional mobility was deferred at this time 2/2 safety concerns and low hemoglobin of 4 9  Per PCA [place] pt completed stand-pivot transfer from bed to brenda chair with hand-hold assistance of two people  Will follow and assess as medically appropriate      Activity Tolerance   Activity Tolerance Patient limited by fatigue;Treatment limited secondary to medical complications (Comment)  (baseline dementia )   Nurse Made Aware AKHIL Miranda made aware of outcomes    RUE Assessment   RUE Assessment WFL  (~80 degrees shoulder flexion, able to touch top of head)   LUE Assessment   LUE Assessment WFL  (~80 degrees shoulder flexion, able to touch top of head)   Hand Function   Gross Motor Coordination Functional Fine Motor Coordination Functional  (demonstrated ability to grasp utensils and bring to mouth )   Cognition   Overall Cognitive Status Impaired   Arousal/Participation Alert; Cooperative   Attention Attends with cues to redirect   Orientation Level Oriented to person;Disoriented to place; Disoriented to time;Disoriented to situation   Memory Decreased long term memory;Decreased recall of biographical information;Decreased short term memory;Decreased recall of recent events   Following Commands Follows one step commands inconsistently   Assessment   Limitation Decreased ADL status; Decreased UE strength;Decreased Safe judgement during ADL;Decreased cognition;Decreased endurance;Decreased self-care trans;Decreased high-level ADLs   Prognosis Fair   Assessment Pt is a 80 y o  female who was admitted to [address] [place] [address] on 11/21/2019 with Iron deficiency anemia secondary to inadequate dietary iron intake  At this time, patient is also affected by the comorbidities of: cellulitis of RLE, dementia, CHF, HTN, and parkinson disease  Additionally, patient is affected by the following personal factors:difficulty performing ADLS and difficulty performing IADLS   Prior to admission, Patient unable to provide PLOF, all information obtained from chart review  Will discuss with CM  Patient lives at [facility]  Upon OT evaluation, patient requires maximum assist for UB ADLs and total assist for LB ADLs  Occupational performance is affected by the following deficits: decreased ROM, decreased strength, decreased balance, decreased tolerance, impaired arousal, impaired attention, impaired initiation, impaired memory, impaired sequencing, impaired problem solving and decreased safety awareness  Based on the following information, patient would benefit from continued skilled OT treatment while in the hospital to address deficits and maximize level of functional independence with ADL's and functional mobility   Occupational Performance areas to address include: grooming, bathing/shower, toilet hygiene, dressing, functional mobility and clothing management  From OT standpoint, recommendation at time of d/c would be return to SNF  Goals   Patient Goals no goals expressed at this time 2/2 decreased cognition    Plan   Treatment Interventions ADL retraining;Functional transfer training;UE strengthening/ROM; Endurance training;Cognitive reorientation; Compensatory technique education; Activityengagement   Goal Expiration Date 12/02/19   OT Treatment Day 0   OT Frequency 2-3x/wk   Recommendation   OT Discharge Recommendation Other (Comment)  (Return to SNF)   OT - OK to Discharge Yes  (Once medically cleared)   Barthel Index   Feeding 5   Bathing 0   Grooming Score 0   Dressing Score 0   Bladder Score 5   Bowels Score 5   Toilet Use Score 5   Transfers (Bed/Chair) Score 5   Mobility (Level Surface) Score 0   Stairs Score 0   Barthel Index Score 25     GOALS:    *ADL transfers with Min (A) for inc'd independence with ADLs/purposeful tasks    *UB ADL with Min (A) for inc'd independence with self cares    *LB ADL with Min (A) using AE prn for inc'd independence with self cares    *Toileting with Min (A) for clothing management and hygiene for return to PLOF with personal care    *Increase static stand balance to fair and dyn stand balance to fair- for inc'd safety with standing purposeful tasks    *Increase stand tolerance x5 m for inc'd tolerance with standing purposeful tasks    *Participate in 10m UE therex to increase overall stamina/activity tolerance for purposeful tasks    *Bed mobility- Min (A) for inc'd independence to manage own comfort and initiate EOB & OOB purposeful tasks    *Patient will increase OOB/sitting tolerance to 2-4 hours per day to increase participation in self-care and leisure tasks with no s/s of exertion           Kobe Navas MS, OTR/L

## 2019-11-23 PROBLEM — I82.411 ACUTE DEEP VEIN THROMBOSIS (DVT) OF FEMORAL VEIN OF RIGHT LOWER EXTREMITY (HCC): Status: ACTIVE | Noted: 2019-11-23

## 2019-11-23 LAB
GLUCOSE SERPL-MCNC: 107 MG/DL (ref 65–140)
GLUCOSE SERPL-MCNC: 74 MG/DL (ref 65–140)
GLUCOSE SERPL-MCNC: 87 MG/DL (ref 65–140)
GLUCOSE SERPL-MCNC: 92 MG/DL (ref 65–140)
HCT VFR BLD AUTO: 32.9 % (ref 42–47)
HGB BLD-MCNC: 11.2 G/DL (ref 12–16)

## 2019-11-23 PROCEDURE — C9113 INJ PANTOPRAZOLE SODIUM, VIA: HCPCS | Performed by: NURSE PRACTITIONER

## 2019-11-23 PROCEDURE — 30233N1 TRANSFUSION OF NONAUTOLOGOUS RED BLOOD CELLS INTO PERIPHERAL VEIN, PERCUTANEOUS APPROACH: ICD-10-PCS | Performed by: INTERNAL MEDICINE

## 2019-11-23 PROCEDURE — 99232 SBSQ HOSP IP/OBS MODERATE 35: CPT | Performed by: PHYSICIAN ASSISTANT

## 2019-11-23 PROCEDURE — P9016 RBC LEUKOCYTES REDUCED: HCPCS

## 2019-11-23 PROCEDURE — 85018 HEMOGLOBIN: CPT | Performed by: PHYSICIAN ASSISTANT

## 2019-11-23 PROCEDURE — 85014 HEMATOCRIT: CPT | Performed by: PHYSICIAN ASSISTANT

## 2019-11-23 PROCEDURE — 82948 REAGENT STRIP/BLOOD GLUCOSE: CPT

## 2019-11-23 PROCEDURE — 86902 BLOOD TYPE ANTIGEN DONOR EA: CPT

## 2019-11-23 RX ADMIN — CARBIDOPA AND LEVODOPA 1.5 TABLET: 25; 100 TABLET ORAL at 21:26

## 2019-11-23 RX ADMIN — PANTOPRAZOLE SODIUM 40 MG: 40 INJECTION, POWDER, FOR SOLUTION INTRAVENOUS at 21:25

## 2019-11-23 RX ADMIN — PANTOPRAZOLE SODIUM 40 MG: 40 INJECTION, POWDER, FOR SOLUTION INTRAVENOUS at 10:08

## 2019-11-23 RX ADMIN — FUROSEMIDE 20 MG: 10 INJECTION, SOLUTION INTRAMUSCULAR; INTRAVENOUS at 10:07

## 2019-11-23 RX ADMIN — CEPHALEXIN 250 MG: 250 CAPSULE ORAL at 05:47

## 2019-11-23 RX ADMIN — NYSTATIN: 100000 CREAM TOPICAL at 18:21

## 2019-11-23 RX ADMIN — CARBIDOPA AND LEVODOPA 1.5 TABLET: 25; 100 TABLET ORAL at 18:21

## 2019-11-23 RX ADMIN — CARBIDOPA AND LEVODOPA 1.5 TABLET: 25; 100 TABLET ORAL at 13:21

## 2019-11-23 RX ADMIN — METOPROLOL TARTRATE 37.5 MG: 25 TABLET, FILM COATED ORAL at 10:05

## 2019-11-23 RX ADMIN — CARBIDOPA AND LEVODOPA 1.5 TABLET: 25; 100 TABLET ORAL at 10:11

## 2019-11-23 RX ADMIN — FERROUS SULFATE TAB 325 MG (65 MG ELEMENTAL FE) 325 MG: 325 (65 FE) TAB at 10:06

## 2019-11-23 RX ADMIN — CYANOCOBALAMIN TAB 500 MCG 1000 MCG: 500 TAB at 10:07

## 2019-11-23 RX ADMIN — CEPHALEXIN 250 MG: 250 CAPSULE ORAL at 00:29

## 2019-11-23 RX ADMIN — NYSTATIN: 100000 CREAM TOPICAL at 10:05

## 2019-11-23 RX ADMIN — CEPHALEXIN 250 MG: 250 CAPSULE ORAL at 13:21

## 2019-11-23 RX ADMIN — CEPHALEXIN 250 MG: 250 CAPSULE ORAL at 18:21

## 2019-11-23 NOTE — ASSESSMENT & PLAN NOTE
· INR 1 88  · Secondary to Xarelto which was started November 1st for the DVT   · Xarelto is on hold

## 2019-11-23 NOTE — ASSESSMENT & PLAN NOTE
· Per records appears patient was diagnosed with DVT November 1st started on Xarelto  · CT findings with DVT in the right common femoral vein, right external iliac vein and distal portion of the right common iliac vein  · Findings reviewed with the granddaughter  · Anticoagulation was placed on hold secondary to the GI bleeding

## 2019-11-23 NOTE — ASSESSMENT & PLAN NOTE
· Patient with acute blood loss anemia with suspected GI bleed with chronic iron deficiency anemia  · With suspected GI bleed  Patient with heme-positive stools  And acute anemia    · Rectal exam w/ dark stool and hemoccult  Positive  · Continue patient's iron supplementation along with B12 supplementation  · Patient has a baseline hemoglobin between 10 and 11; outpatient blood work which showed hemoglobin of 5 6, ER was 6 and currently 4 9 - after blood hgb 11 2  · Patient's anticoagulation will be on hold, per record she was on Xarelto for a recent diagnosis of a DVT starting November 1st  · 11/22: GI evaluated the patient spoke with family plan is to do the CT abdomen pelvis  Hold off on any EGD/colonoscopy procedures  · 11/23:  CT findings of large irregularly shaped lobular mass involving the stomach highly concerning for malignancy     Differentials including primary gastric carcinoma, lymphoma, or metastatic disease from elsewhere  Upper abdominal adenopathy most concerning for metastatic lymphadenopathy  · CT findings were reviewed with patient's granddaughter Live Tellez, the power-of-, discussed hospice and she was in agreement    Case management is aware and will make a referral

## 2019-11-23 NOTE — ASSESSMENT & PLAN NOTE
· Patient had been admitted to [facility] on 05/28/2019 with right lower extremity cellulitis  · It does appear as if the patient's right lower extremity is reddened greater than left, is warm and tender to the touch  · Will start Keflex p o  As patient is having issues with IV access  · Bilateral lower extremity edema, elevate lower extremities, wrapped bilateral legs with Ace wraps from toes to knees

## 2019-11-23 NOTE — PROGRESS NOTES
Progress Note - Douglas Felix 12/24/1932, 80 y o  female MRN: [id number]    Unit/Bed#: -02 Encounter: [id number]    Primary Care Provider: Fer Flores DO   Date and time admitted to hospital: 11/21/2019  7:19 PM        * Acute blood loss anemia  Assessment & Plan  · Patient with acute blood loss anemia with suspected GI bleed with chronic iron deficiency anemia  · With suspected GI bleed  Patient with heme-positive stools  And acute anemia    · Rectal exam w/ dark stool and hemoccult  Positive  · Continue patient's iron supplementation along with B12 supplementation  · Patient has a baseline hemoglobin between 10 and 11; outpatient blood work which showed hemoglobin of 5 6, ER was 6 and currently 4 9 - after blood hgb 11 2  · Patient's anticoagulation will be on hold, per record she was on Xarelto for a recent diagnosis of a DVT starting November 1st  · 11/22: GI evaluated the patient spoke with family plan is to do the CT abdomen pelvis  Hold off on any EGD/colonoscopy procedures  · 11/23:  CT findings of large irregularly shaped lobular mass involving the stomach highly concerning for malignancy     Differentials including primary gastric carcinoma, lymphoma, or metastatic disease from elsewhere  Upper abdominal adenopathy most concerning for metastatic lymphadenopathy  · CT findings were reviewed with patient's granddaughter Adrianna Fermin, the power-of-, discussed hospice and she was in agreement    Case management is aware and will make a referral    Acute deep vein thrombosis (DVT) of femoral vein of right lower extremity (HCC)  Assessment & Plan  · Per records appears patient was diagnosed with DVT November 1st started on Xarelto  · CT findings with DVT in the right common femoral vein, right external iliac vein and distal portion of the right common iliac vein  · Findings reviewed with the granddaughter  · Anticoagulation was placed on hold secondary to the GI bleeding    Coagulopathy [facility])  Assessment & Plan  · INR 1 88  · Secondary to Xarelto which was started November 1st for the DVT   · Xarelto is on hold    Parkinson disease ([address] )  Assessment & Plan  · Continue Sinemet qid    Essential hypertension  Assessment & Plan  · Continue metoprolol and Lasix    Chronic diastolic congestive heart failure (HCC)  Assessment & Plan  Wt Readings from Last 3 Encounters:   11/23/19 80 6 kg (177 lb 11 1 oz)   06/03/19 81 kg (178 lb 9 2 oz)   11/22/18 72 6 kg (160 lb)     · Continue Lasix, cardiology recently decreased patient Lasix to 20 mg daily from 40 mg daily  · Cardiology also increased patient's metoprolol to 37 5 twice a day from 25 bid a day  · Patient does have chronic bilateral lower extremity edema, +2, nonpitting  · 11/23:  Continue to monitor blood pressure  BP Seems to improve with the blood transfusion        Alzheimer's dementia without behavioral disturbance [facility])  Assessment & Plan  · Patient with severe Alzheimer's dementia  · Fall precautions    Cellulitis of right lower extremity  Assessment & Plan  · Patient had been admitted to [facility] on 05/28/2019 with right lower extremity cellulitis  · It does appear as if the patient's right lower extremity is reddened greater than left, is warm and tender to the touch  · Will start Keflex p o  As patient is having issues with IV access  · Bilateral lower extremity edema, elevate lower extremities, wrapped bilateral legs with Ace wraps from toes to knees  VTE Pharmacologic Prophylaxis:  Vena dynes secondary to GI bleed    Patient Centered Rounds: I have performed bedside rounds with nursing staff today      Discussions with Specialists or Other Care Team Provider:  Case management, nursing  Education and Discussions with Family / Patient:  Lian Gutierrez    Current Length of Stay: 2 day(s)    Current Patient Status: Inpatient   Certification Statement: The patient will continue to require additional inpatient hospital stay due to Hospice evaluation    Discharge Plan:  Likely return to [facility] on hospice await plan    Code Status: Level 3 - DNAR and DNI    Subjective:   Patient seen in the chair in [place]  She waved to me  She could not tell me if she had any pain   She was hungry and wanted to eat her food     Objective:     Vitals:   Temp (24hrs), Av 8 °F (36 6 °C), Min:96 8 °F (36 °C), Max:98 8 °F (37 1 °C)    Temp:  [96 8 °F (36 °C)-98 8 °F (37 1 °C)] 97 6 °F (36 4 °C)  HR:  [63-92] 68  Resp:  [17-18] 18  BP: ()/(46-82) 122/64  SpO2:  [95 %-99 %] 95 %  Body mass index is 34 7 kg/m²  Input and Output Summary (last 24 hours): Intake/Output Summary (Last 24 hours) at 2019 1148  Last data filed at 2019 8338  Gross per 24 hour   Intake 2050 ml   Output --   Net 2050 ml       Physical Exam:     Physical Exam   Constitutional: She appears well-developed and well-nourished  HENT:   Head: Normocephalic and atraumatic  Eyes: Conjunctivae and EOM are normal  Right eye exhibits no discharge  Left eye exhibits no discharge  Neck: Normal range of motion  No tracheal deviation present  Cardiovascular: Normal rate and regular rhythm  Exam reveals no gallop and no friction rub  Murmur heard  Pulmonary/Chest: Effort normal and breath sounds normal  No respiratory distress  She has no wheezes  She has no rales  Abdominal: Soft  Bowel sounds are normal  She exhibits no distension  There is no tenderness  There is no guarding  Musculoskeletal: Normal range of motion  She exhibits edema (Positive pedal edema)  She exhibits no tenderness or deformity  Lower extremities in vena dynes   Neurological: She is alert  Speech intact, moving extremities   Skin: Skin is warm and dry  No rash noted  No erythema  No pallor  Psychiatric:   Baseline dementia   Nursing note and vitals reviewed        Additional Data:   Labs:  Results from last 7 days   Lab Units 19  0930  19  0508   WBC Thousand/uL  --   -- 9  60   HEMOGLOBIN g/dL 11 2*   < > 4 9*   HEMATOCRIT % 32 9*  --  14 7*   PLATELETS Thousands/uL  --   --  288   NEUTROS PCT %  --   --  70   LYMPHS PCT %  --   --  22   MONOS PCT %  --   --  7   EOS PCT %  --   --  1    < > = values in this interval not displayed  Results from last 7 days   Lab Units 11/22/19  0508   POTASSIUM mmol/L 4 0   CHLORIDE mmol/L 102   CO2 mmol/L 31   BUN mg/dL 20   CREATININE mg/dL 0 57*   CALCIUM mg/dL 7 7*   ALK PHOS U/L 84   ALT U/L <3*   AST U/L 12*     Results from last 7 days   Lab Units 11/21/19  1941   INR  1 88*     Results from last 7 days   Lab Units 11/23/19  1100 11/23/19  0659 11/22/19  2022 11/22/19  1618 11/22/19  1053 11/22/19  0713   POC GLUCOSE mg/dl 87 74 88 90 93 92           * I Have Reviewed All Lab Data Listed Above  * Additional Pertinent Lab Tests Reviewed: Cal Segovia Admission  Reviewed    Imaging:  Imaging Reports Reviewed Today Include: CT abd/pelvis    Last 24 Hours Medication List:     Current Facility-Administered Medications:  acetaminophen 650 mg Oral Q6H PRN HELLEN Ellington   carbidopa-levodopa 1 5 tablet Oral 4x Daily HELLEN Ellington   cephalexin 250 mg Oral Q6H [address] HELLEN Ellington   vitamin B-12 1,000 mcg Oral Daily HELLEN Ellington   ferrous sulfate 325 mg Oral Daily With Breakfast HELLEN Ellington   furosemide 20 mg Intravenous Daily Melinda Mensah PA-C   iohexol 50 mL [facility], MD   metoprolol tartrate 37 5 mg Oral Q12H [address] HELLEN Ellington   nystatin  Topical BID HELLEN Ellington   ondansetron 4 mg Intravenous Q6H PRN HELLEN Ellington   pantoprazole 40 mg Intravenous Q12H [address] HELLEN Ellington   simethicone 80 mg Oral Q6H PRN HELLEN Laird        Today, Patient Was Seen By: Kajal Fountain PA-C    ** Please Note: Dictation voice to text software may have been used in the creation of this document   **

## 2019-11-23 NOTE — SOCIAL WORK
Received a referral for hospice  TC to pt granddaughter Lori Marinelli ( [phone]) who is agreeable to [facility] to evaluate the pt  Granddaughter is sick and may have the flu  Notified [facility] of same  Pt is a resident of [facility]

## 2019-11-23 NOTE — PLAN OF CARE
Problem: Potential for Falls  Goal: Patient will remain free of falls  Description  INTERVENTIONS:  - Assess patient frequently for physical needs  -  Identify cognitive and physical deficits and behaviors that affect risk of falls    -  [place] fall precautions as indicated by assessment   - Educate patient/family on patient safety including physical limitations  - Instruct patient to call for assistance with activity based on assessment  - Modify environment to reduce risk of injury  - Consider OT/PT consult to assist with strengthening/mobility  Outcome: Progressing     Problem: INFECTION - ADULT  Goal: Absence or prevention of progression during hospitalization  Description  INTERVENTIONS:  - Assess and monitor for signs and symptoms of infection  - Monitor lab/diagnostic results  - Administer medications as ordered  - Instruct and encourage patient and family to use good hand hygiene technique  - Identify and instruct in appropriate isolation precautions for identified infection/condition   Outcome: Progressing  Goal: Absence of fever/infection during neutropenic period  Description  INTERVENTIONS:  - Monitor WBC    Outcome: Progressing     Problem: SAFETY ADULT  Goal: Maintain or return to baseline ADL function  Description  INTERVENTIONS:  -  Assess patient's ability to carry out ADLs; assess patient's baseline for ADL function and identify physical deficits which impact ability to perform ADLs (bathing, care of mouth/teeth, toileting, grooming, dressing, etc )  - Assess/evaluate cause of self-care deficits   - Assess range of motion  - Assess patient's mobility; develop plan if impaired  - Assess patient's need for assistive devices and provide as appropriate  - Encourage maximum independence but intervene and supervise when necessary  - Involve family in performance of ADLs  - Assess for home care needs following discharge   - Consider OT consult to assist with ADL evaluation and planning for discharge  - Provide patient education as appropriate  Outcome: Progressing  Goal: Maintain or return mobility status to optimal level  Description  INTERVENTIONS:  - Assess patient's baseline mobility status (ambulation, transfers, stairs, etc )    - Identify cognitive and physical deficits and behaviors that affect mobility  - Identify mobility aids required to assist with transfers and/or ambulation (gait belt, sit-to-stand, lift, walker, cane, etc )  - [place] fall precautions as indicated by assessment  - Record patient progress and toleration of activity level on Mobility SBAR; progress patient to next Phase/Stage  - Instruct patient to call for assistance with activity based on assessment  - Consider rehabilitation consult to assist with strengthening/weightbearing, etc   Outcome: Progressing     Problem: DISCHARGE PLANNING  Goal: Discharge to home or other facility with appropriate resources  Description  INTERVENTIONS:  - Identify barriers to discharge w/patient and caregiver  - Arrange for needed discharge resources and transportation as appropriate  - Identify discharge learning needs (meds, wound care, etc )  - Refer to Case Management Department for coordinating discharge planning if the patient needs post-hospital services based on physician/advanced practitioner order or complex needs related to functional status, cognitive ability, or social support system   Outcome: Progressing     Problem: HEMATOLOGIC - ADULT  Goal: Maintains hematologic stability  Description  INTERVENTIONS  - Assess for signs and symptoms of bleeding or hemorrhage  - Monitor labs  - Administer supportive blood products/factors as ordered and appropriate  Outcome: Progressing     Problem: Prexisting or High Potential for Compromised Skin Integrity  Goal: Skin integrity is maintained or improved  Description  INTERVENTIONS:  - Identify patients at risk for skin breakdown  - Assess and monitor skin integrity  - Assess and monitor nutrition and hydration status  - Monitor labs   - Assess for incontinence   - Turn and reposition patient  - Assist with mobility/ambulation  - Relieve pressure over bony prominences  - Avoid friction and shearing  - Provide appropriate hygiene as needed including keeping skin clean and dry  - Evaluate need for skin moisturizer/barrier cream  - Collaborate with interdisciplinary team   - Patient/family teaching  - Consider wound care consult   Outcome: Progressing     Problem: DISCHARGE PLANNING - CARE MANAGEMENT  Goal: Discharge to post-acute care or home with appropriate resources  Description  INTERVENTIONS:  - Conduct assessment to determine patient/family and health care team treatment goals, and need for post-acute services based on payer coverage, community resources, and patient preferences, and barriers to discharge  - Address psychosocial, clinical, and financial barriers to discharge as identified in assessment in conjunction with the patient/family and health care team  - Arrange appropriate level of post-acute services according to patient's   needs and preference and payer coverage in collaboration with the physician and health care team  - Communicate with and update the patient/family, physician, and health care team regarding progress on the discharge plan  - Arrange appropriate transportation to post-acute venues    Return to [facility] on discharge   Will need wheelchair Kristie Karlene transport   Outcome: Progressing

## 2019-11-23 NOTE — ASSESSMENT & PLAN NOTE
Wt Readings from Last 3 Encounters:   11/23/19 80 6 kg (177 lb 11 1 oz)   06/03/19 81 kg (178 lb 9 2 oz)   11/22/18 72 6 kg (160 lb)     · Continue Lasix, cardiology recently decreased patient Lasix to 20 mg daily from 40 mg daily  · Cardiology also increased patient's metoprolol to 37 5 twice a day from 25 bid a day  · Patient does have chronic bilateral lower extremity edema, +2, nonpitting  · 11/23:  Continue to monitor blood pressure    BP Seems to improve with the blood transfusion

## 2019-11-24 LAB
ABO GROUP BLD BPU: NORMAL
BACTERIA UR QL AUTO: ABNORMAL /HPF
BILIRUB UR QL STRIP: NEGATIVE
BPU ID: NORMAL
CLARITY UR: CLEAR
COLOR UR: ABNORMAL
CROSSMATCH: NORMAL
GLUCOSE SERPL-MCNC: 102 MG/DL (ref 65–140)
GLUCOSE SERPL-MCNC: 80 MG/DL (ref 65–140)
GLUCOSE SERPL-MCNC: 88 MG/DL (ref 65–140)
GLUCOSE SERPL-MCNC: 96 MG/DL (ref 65–140)
GLUCOSE UR STRIP-MCNC: NEGATIVE MG/DL
HGB UR QL STRIP.AUTO: ABNORMAL
KETONES UR STRIP-MCNC: NEGATIVE MG/DL
LEUKOCYTE ESTERASE UR QL STRIP: ABNORMAL
NITRITE UR QL STRIP: NEGATIVE
NON-SQ EPI CELLS URNS QL MICRO: ABNORMAL /HPF
PH UR STRIP.AUTO: 7 [PH]
PROT UR STRIP-MCNC: NEGATIVE MG/DL
RBC #/AREA URNS AUTO: ABNORMAL /HPF
SP GR UR STRIP.AUTO: <=1.005 (ref 1–1.03)
UNIT DISPENSE STATUS: NORMAL
UNIT PRODUCT CODE: NORMAL
UNIT RH: NORMAL
UROBILINOGEN UR QL STRIP.AUTO: 0.2 E.U./DL
WBC #/AREA URNS AUTO: ABNORMAL /HPF

## 2019-11-24 PROCEDURE — C9113 INJ PANTOPRAZOLE SODIUM, VIA: HCPCS | Performed by: NURSE PRACTITIONER

## 2019-11-24 PROCEDURE — 82948 REAGENT STRIP/BLOOD GLUCOSE: CPT

## 2019-11-24 PROCEDURE — 81001 URINALYSIS AUTO W/SCOPE: CPT | Performed by: NURSE PRACTITIONER

## 2019-11-24 PROCEDURE — 99232 SBSQ HOSP IP/OBS MODERATE 35: CPT | Performed by: PHYSICIAN ASSISTANT

## 2019-11-24 RX ORDER — PANTOPRAZOLE SODIUM 40 MG/1
40 TABLET, DELAYED RELEASE ORAL
Status: DISCONTINUED | OUTPATIENT
Start: 2019-11-24 | End: 2019-11-25 | Stop reason: HOSPADM

## 2019-11-24 RX ADMIN — NYSTATIN: 100000 CREAM TOPICAL at 17:52

## 2019-11-24 RX ADMIN — PANTOPRAZOLE SODIUM 40 MG: 40 TABLET, DELAYED RELEASE ORAL at 22:30

## 2019-11-24 RX ADMIN — CEPHALEXIN 250 MG: 250 CAPSULE ORAL at 12:27

## 2019-11-24 RX ADMIN — NYSTATIN: 100000 CREAM TOPICAL at 08:22

## 2019-11-24 RX ADMIN — CARBIDOPA AND LEVODOPA 1.5 TABLET: 25; 100 TABLET ORAL at 12:27

## 2019-11-24 RX ADMIN — METOPROLOL TARTRATE 37.5 MG: 25 TABLET, FILM COATED ORAL at 08:23

## 2019-11-24 RX ADMIN — CARBIDOPA AND LEVODOPA 1.5 TABLET: 25; 100 TABLET ORAL at 17:52

## 2019-11-24 RX ADMIN — FUROSEMIDE 20 MG: 10 INJECTION, SOLUTION INTRAMUSCULAR; INTRAVENOUS at 08:22

## 2019-11-24 RX ADMIN — FERROUS SULFATE TAB 325 MG (65 MG ELEMENTAL FE) 325 MG: 325 (65 FE) TAB at 08:22

## 2019-11-24 RX ADMIN — CEPHALEXIN 250 MG: 250 CAPSULE ORAL at 01:23

## 2019-11-24 RX ADMIN — CARBIDOPA AND LEVODOPA 1.5 TABLET: 25; 100 TABLET ORAL at 22:02

## 2019-11-24 RX ADMIN — CEPHALEXIN 250 MG: 250 CAPSULE ORAL at 17:52

## 2019-11-24 RX ADMIN — CARBIDOPA AND LEVODOPA 1.5 TABLET: 25; 100 TABLET ORAL at 08:23

## 2019-11-24 RX ADMIN — CYANOCOBALAMIN TAB 500 MCG 1000 MCG: 500 TAB at 08:22

## 2019-11-24 RX ADMIN — PANTOPRAZOLE SODIUM 40 MG: 40 INJECTION, POWDER, FOR SOLUTION INTRAVENOUS at 08:22

## 2019-11-24 RX ADMIN — CEPHALEXIN 250 MG: 250 CAPSULE ORAL at 06:15

## 2019-11-24 NOTE — ASSESSMENT & PLAN NOTE
· Patient with acute blood loss anemia with suspected GI bleed with chronic iron deficiency anemia  · With suspected GI bleed  Patient with heme-positive stools  And acute anemia    · Rectal exam w/ dark stool and hemoccult  Positive  · Continue patient's iron supplementation along with B12 supplementation  · Patient has a baseline hemoglobin between 10 and 11; outpatient blood work which showed hemoglobin of 5 6, ER was 6 and currently 4 9 - after blood hgb 11 2  · Patient's anticoagulation will be on hold, per record she was on Xarelto for a recent diagnosis of a DVT starting November 1st  · 11/22: GI evaluated the patient spoke with family plan is to do the CT abdomen pelvis  Hold off on any EGD/colonoscopy procedures  · 11/23:  CT findings of large irregularly shaped lobular mass involving the stomach highly concerning for malignancy     Differentials including primary gastric carcinoma, lymphoma, or metastatic disease from elsewhere  Upper abdominal adenopathy most concerning for metastatic lymphadenopathy  · CT findings were reviewed with patient's granddaughter Teressa Teixeira, the power-of-, discussed hospice and she was in agreement  · 11/24: Patient has been accepted to hospice await planned for discharge in the a    Likely

## 2019-11-24 NOTE — HOSPICE NOTE
Evaluated pt  Pt approved by Dr Boyd Dobson, hospice physician, for home hospice upon return to [facility]  Call placed to deborah Roa   message left to return call to discuss hospice services  Will continue to follow until return to [facility]  Will await contact with family

## 2019-11-24 NOTE — ASSESSMENT & PLAN NOTE
Wt Readings from Last 3 Encounters:   11/24/19 79 6 kg (175 lb 7 8 oz)   06/03/19 81 kg (178 lb 9 2 oz)   11/22/18 72 6 kg (160 lb)     · Metoprolol 25mg bid  · Patient does have chronic bilateral lower extremity edema, +2, nonpitting  · 11/23:  Continue to monitor blood pressure    BP Seems to improve with the blood transfusion

## 2019-11-24 NOTE — PROGRESS NOTES
Progress Note - Yamini Chambers 12/24/1932, 80 y o  female MRN: [id number]    Unit/Bed#: -02 Encounter: [id number]    Primary Care Provider: Avelina Snellen, DO   Date and time admitted to hospital: 11/21/2019  7:19 PM        * Acute blood loss anemia  Assessment & Plan  · Patient with acute blood loss anemia with suspected GI bleed with chronic iron deficiency anemia  · With suspected GI bleed  Patient with heme-positive stools  And acute anemia    · Rectal exam w/ dark stool and hemoccult  Positive  · Continue patient's iron supplementation along with B12 supplementation  · Patient has a baseline hemoglobin between 10 and 11; outpatient blood work which showed hemoglobin of 5 6, ER was 6 and currently 4 9 - after blood hgb 11 2  · Patient's anticoagulation will be on hold, per record she was on Xarelto for a recent diagnosis of a DVT starting November 1st  · 11/22: GI evaluated the patient spoke with family plan is to do the CT abdomen pelvis  Hold off on any EGD/colonoscopy procedures  · 11/23:  CT findings of large irregularly shaped lobular mass involving the stomach highly concerning for malignancy     Differentials including primary gastric carcinoma, lymphoma, or metastatic disease from elsewhere  Upper abdominal adenopathy most concerning for metastatic lymphadenopathy  · CT findings were reviewed with patient's granddaughter Petra Parent, the power-of-, discussed hospice and she was in agreement  · 11/24: Patient has been accepted to hospice await planned for discharge in the a m   Likely    Acute deep vein thrombosis (DVT) of femoral vein of right lower extremity (HCC)  Assessment & Plan  · Per records appears patient was diagnosed with DVT November 1st started on Xarelto  · CT findings with DVT in the right common femoral vein, right external iliac vein and distal portion of the right common iliac vein  · Findings reviewed with the granddaughter  · Anticoagulation was placed on hold secondary to the GI bleeding    Coagulopathy (HCC)  Assessment & Plan  · INR 1 88  · Secondary to Xarelto which was started  for the DVT   · Xarelto is on hold    Parkinson disease ([address] )  Assessment & Plan  · Continue Sinemet qid    Essential hypertension  Assessment & Plan  · Continue metoprolol and Lasix    Chronic diastolic congestive heart failure (HCC)  Assessment & Plan  Wt Readings from Last 3 Encounters:   19 79 6 kg (175 lb 7 8 oz)   19 81 kg (178 lb 9 2 oz)   18 72 6 kg (160 lb)     · Metoprolol 25mg bid  · Patient does have chronic bilateral lower extremity edema, +2, nonpitting  · :  Continue to monitor blood pressure  BP Seems to improve with the blood transfusion        Cellulitis of right lower extremity  Assessment & Plan  · Patient had been admitted to [facility] on 2019 with right lower extremity cellulitis  · It does appear as if the patient's right lower extremity is reddened greater than left, is warm and tender to the touch  · Keflex while in hospital  · Bilateral lower extremity edema, elevate lower extremities, wrapped bilateral legs with Ace wraps from toes to knees  VTE Pharmacologic Prophylaxis: Vena dynes    Patient Centered Rounds: I have performed bedside rounds with nursing staff today  Education and Discussions with Family / Patient:  Family updated at bedside    Current Length of Stay: 3 day(s)    Current Patient Status: Inpatient   Certification Statement: The patient will continue to require additional inpatient hospital stay due to Plan to return to [facility] on hospice in the a m  Discharge Plan:  In the a m  To [facility] on hospice     Code Status: Level 3 - DNAR and DNI    Subjective:   Patient seen in bed she was asking when dinners coming  She could not give me any other complaints    She did drink some water for me    Objective:     Vitals:   Temp (24hrs), Av 7 °F (36 5 °C), Min:97 6 °F (36 4 °C), Max:97 9 °F (36 6 °C)    Temp:  [97 6 °F (36 4 °C)-97 9 °F (36 6 °C)] 97 9 °F (36 6 °C)  HR:  [62-71] 71  Resp:  [18] 18  BP: ()/(50-59) 115/59  SpO2:  [92 %-97 %] 97 %  Body mass index is 34 27 kg/m²  Input and Output Summary (last 24 hours): Intake/Output Summary (Last 24 hours) at 11/24/2019 1439  Last data filed at 11/24/2019 1200  Gross per 24 hour   Intake 760 ml   Output 1350 ml   Net -590 ml       Physical Exam:     Physical Exam   Constitutional: She appears well-developed and well-nourished  Elderly  female   HENT:   Head: Normocephalic and atraumatic  Eyes: Conjunctivae and EOM are normal  Right eye exhibits no discharge  Left eye exhibits no discharge  Neck: Normal range of motion  No tracheal deviation present  Cardiovascular: Normal rate, regular rhythm and normal heart sounds  Exam reveals no gallop and no friction rub  No murmur heard  Pulmonary/Chest: Effort normal  No respiratory distress  She has decreased breath sounds  She has no wheezes  She has no rales  Abdominal: Soft  Bowel sounds are normal  She exhibits no distension and no mass  There is no tenderness  There is no guarding  Musculoskeletal: Normal range of motion  She exhibits edema (right leg)  She exhibits no tenderness or deformity  Lower extremity in venadynes   Neurological: She is alert  Skin: Skin is warm and dry  No rash noted  No erythema  There is pallor  Psychiatric:   Baseline dementia   Nursing note and vitals reviewed  Additional Data:     Labs:    Results from last 7 days   Lab Units 11/23/19  0930  11/22/19  0508   WBC Thousand/uL  --   --  9 60   HEMOGLOBIN g/dL 11 2*   < > 4 9*   HEMATOCRIT % 32 9*  --  14 7*   PLATELETS Thousands/uL  --   --  288   NEUTROS PCT %  --   --  70   LYMPHS PCT %  --   --  22   MONOS PCT %  --   --  7   EOS PCT %  --   --  1    < > = values in this interval not displayed       Results from last 7 days   Lab Units 11/22/19  0508   POTASSIUM mmol/L 4 0   CHLORIDE mmol/L 102   CO2 mmol/L 31   BUN mg/dL 20   CREATININE mg/dL 0 57*   CALCIUM mg/dL 7 7*   ALK PHOS U/L 84   ALT U/L <3*   AST U/L 12*     Results from last 7 days   Lab Units 11/21/19  1941   INR  1 88*     Results from last 7 days   Lab Units 11/24/19  1119 11/24/19  0818 11/23/19  2105 11/23/19  1615 11/23/19  1100 11/23/19  0659 11/22/19  2022 11/22/19  1618 11/22/19  1053 11/22/19  0713   POC GLUCOSE mg/dl 88 80 92 107 87 74 88 90 93 92     * I Have Reviewed All Lab Data Listed Above  * Additional Pertinent Lab Tests Reviewed: Cal Segovia Admission  Reviewed    Last 24 Hours Medication List:     Current Facility-Administered Medications:  acetaminophen 650 mg Oral Q6H PRN Cece Guy, CRNP   carbidopa-levodopa 1 5 tablet Oral 4x Daily Cece Guy, HELLEN   cephalexin 250 mg Oral Q6H [facility] Cece Guy, HELLEN   ferrous sulfate 325 mg Oral Daily With Breakfast Cece Guy, HELLEN   furosemide 20 mg Intravenous Daily Melinda Mensah PA-C   iohexol 50 mL Oral Once in imaging Yuki Figueroa MD   metoprolol tartrate 25 mg Oral Q12H [facility] Melinda Mensah PA-C   nystatin  Topical BID HELLEN Ellington   ondansetron 4 mg Intravenous Q6H PRN Cece Guy, HELLEN   pantoprazole 40 mg Intravenous Q12H [facility] Cece Guy, HELLEN   simethicone 80 mg Oral Q6H PRN HELLEN Alberts        Today, Patient Was Seen By: Barbi Deutsch PA-C    ** Please Note: Dictation voice to text software may have been used in the creation of this document   **

## 2019-11-24 NOTE — ASSESSMENT & PLAN NOTE
· Patient had been admitted to [facility] on 05/28/2019 with right lower extremity cellulitis  · It does appear as if the patient's right lower extremity is reddened greater than left, is warm and tender to the touch  · Keflex while in hospital  · Bilateral lower extremity edema, elevate lower extremities, wrapped bilateral legs with Ace wraps from toes to knees

## 2019-11-25 VITALS
OXYGEN SATURATION: 93 % | WEIGHT: 171.74 LBS | HEART RATE: 67 BPM | RESPIRATION RATE: 18 BRPM | BODY MASS INDEX: 33.72 KG/M2 | DIASTOLIC BLOOD PRESSURE: 60 MMHG | HEIGHT: 60 IN | SYSTOLIC BLOOD PRESSURE: 138 MMHG | TEMPERATURE: 97.3 F

## 2019-11-25 LAB — GLUCOSE SERPL-MCNC: 86 MG/DL (ref 65–140)

## 2019-11-25 PROCEDURE — 99239 HOSP IP/OBS DSCHRG MGMT >30: CPT | Performed by: INTERNAL MEDICINE

## 2019-11-25 PROCEDURE — 82948 REAGENT STRIP/BLOOD GLUCOSE: CPT

## 2019-11-25 RX ORDER — PANTOPRAZOLE SODIUM 40 MG/1
40 TABLET, DELAYED RELEASE ORAL
Refills: 0
Start: 2019-11-25

## 2019-11-25 RX ORDER — FUROSEMIDE 20 MG/1
20 TABLET ORAL DAILY
Refills: 0
Start: 2019-11-26

## 2019-11-25 RX ORDER — MORPHINE SULFATE 100 MG/5ML
10 SOLUTION ORAL
Qty: 15 ML | Refills: 0 | Status: SHIPPED | OUTPATIENT
Start: 2019-11-25 | End: 2019-12-05

## 2019-11-25 RX ORDER — POTASSIUM CHLORIDE 1.5 G/1.77G
20 POWDER, FOR SOLUTION ORAL DAILY
Refills: 0
Start: 2019-11-25

## 2019-11-25 RX ORDER — FUROSEMIDE 20 MG/1
20 TABLET ORAL DAILY
Status: DISCONTINUED | OUTPATIENT
Start: 2019-11-25 | End: 2019-11-25 | Stop reason: HOSPADM

## 2019-11-25 RX ORDER — LORAZEPAM 2 MG/ML
0.5 CONCENTRATE ORAL EVERY 4 HOURS PRN
Qty: 30 ML | Refills: 0 | Status: SHIPPED | OUTPATIENT
Start: 2019-11-25 | End: 2019-12-05

## 2019-11-25 RX ADMIN — PANTOPRAZOLE SODIUM 40 MG: 40 TABLET, DELAYED RELEASE ORAL at 06:59

## 2019-11-25 RX ADMIN — CARBIDOPA AND LEVODOPA 1.5 TABLET: 25; 100 TABLET ORAL at 13:21

## 2019-11-25 RX ADMIN — METOPROLOL TARTRATE 25 MG: 25 TABLET, FILM COATED ORAL at 09:38

## 2019-11-25 RX ADMIN — NYSTATIN: 100000 CREAM TOPICAL at 09:37

## 2019-11-25 RX ADMIN — FERROUS SULFATE TAB 325 MG (65 MG ELEMENTAL FE) 325 MG: 325 (65 FE) TAB at 09:37

## 2019-11-25 RX ADMIN — FUROSEMIDE 20 MG: 20 TABLET ORAL at 10:29

## 2019-11-25 RX ADMIN — CARBIDOPA AND LEVODOPA 1.5 TABLET: 25; 100 TABLET ORAL at 09:38

## 2019-11-25 RX ADMIN — CEPHALEXIN 250 MG: 250 CAPSULE ORAL at 01:40

## 2019-11-25 RX ADMIN — CEPHALEXIN 250 MG: 250 CAPSULE ORAL at 13:20

## 2019-11-25 RX ADMIN — CEPHALEXIN 250 MG: 250 CAPSULE ORAL at 06:59

## 2019-11-25 NOTE — SOCIAL WORK
Pt seen by Aric Fernández from [facility]  She spoke with pt grand daughter [place] and is coordinating consents being signed for hospice care  Plan is for pt to return to [facility] today and be signed onto hospice  CM arranged for Frandy TRINH to transport pt to [facility] at 1500  Dr Kayy Enriquez aware and will discharge pt  Pt nurse Ubaldo Looney aware and will call report to [facility]  Medical necessity form for transport completed and placed on chart  Message left for pt grand daughter [place] to make her awae of the above

## 2019-11-25 NOTE — ASSESSMENT & PLAN NOTE
· Appears to be secondary to GI bleed  · Family opted for conservative management  · Hospice was consulted  · Family has opted to transition patient to hospice  Case management/hospice has arranged for patient to be transitioned to SNF and there will be placed on hospice care

## 2019-11-25 NOTE — PROGRESS NOTES
0600- Pt awake, follows commands, when asked for name and  was unable to speak it but when I asked if her name was Donald Mensah, she said yes and when I asked if her  was 32, she said yes  She seems appropriate but appears she may have some expressive aphasia  She took her morning pills whole with pudding and did well, no coughing, or clearing of throat  She was incontinent of urine and changed the purwick and linens as well as pad  Pt turns but needs direction  Partial bath completed

## 2019-11-25 NOTE — ASSESSMENT & PLAN NOTE
· Patient had been admitted to [facility] on 05/28/2019 with right lower extremity cellulitis  · It does appear as if the patient's right lower extremity is reddened greater than left, is warm and tender to the touch  · Keflex while in hospital  · Bilateral lower extremity edema, elevate lower extremities, wrapped bilateral legs with Ace wraps from toes to knees

## 2019-11-25 NOTE — DISCHARGE SUMMARY
Discharge- Alpesh Barron 12/24/1932, 80 y o  female MRN: [id number]    Unit/Bed#: -02 Encounter: [id number]    Primary Care Provider: Cherylene Hamilton, DO   Date and time admitted to hospital: 11/21/2019  7:19 PM        Acute deep vein thrombosis (DVT) of femoral vein of right lower extremity ([facility] [address] )  Assessment & Plan  · Per records appears patient was diagnosed with DVT November 1st started on Xarelto  · CT findings with DVT in the right common femoral vein, right external iliac vein and distal portion of the right common iliac vein  · Findings reviewed with the granddaughter  · Anticoagulation was placed on hold secondary to the GI bleeding    Parkinson disease ([facility] [address] )  Assessment & Plan  · Continue Sinemet qid    Alzheimer's dementia without behavioral disturbance ([facility] )  Assessment & Plan  · Patient with severe Alzheimer's dementia  · Fall precautions    Cellulitis of right lower extremity  Assessment & Plan  · Patient had been admitted to [facility] on 05/28/2019 with right lower extremity cellulitis  · It does appear as if the patient's right lower extremity is reddened greater than left, is warm and tender to the touch  · Keflex while in hospital  · Bilateral lower extremity edema, elevate lower extremities, wrapped bilateral legs with Ace wraps from toes to knees  * Acute blood loss anemia  Assessment & Plan  · Appears to be secondary to GI bleed  · Family opted for conservative management  · Hospice was consulted  · Family has opted to transition patient to hospice  Case management/hospice has arranged for patient to be transitioned to SNF and there will be placed on hospice care        Discharging Physician / Practitioner: Kareem Garrido MD  PCP: Cherylene Hamilton, DO  Admission Date:   Admission Orders (From admission, onward)     Ordered        11/21/19 2037  Inpatient Admission (expected length of stay for this patient Order details is greater than two midnights)  Once Discharge Date: 11/25/19    Resolved Problems  Date Reviewed: 11/24/2019    None          Consultations During Hospital Stay:  · Gastroenterology    Procedures Performed:   · None    Significant Findings / Test Results:   · GI bleed    Incidental Findings:   · None     Test Results Pending at Discharge (will require follow up): · None     Outpatient Tests Requested:  · None    Complications:     None    Reason for Admission:  GI bleed    Hospital Course: Deborah Red is a 80 y o  female patient who originally presented to the hospital on 11/21/2019 due to GI bleed  Patient was transfused to bring hemoglobin level of regimen 8  GI was consulted and family wanted conservative management and did not want to proceed with EGD/colonoscopy  After further discussion family wanted hospice evaluation  Hospice was consulted and after discussion with family decision was made to transfer patient to SNF for hospice care  Please see above list of diagnoses and related plan for additional information  Condition at Discharge: stable     Discharge Day Visit / Exam:     Subjective:  Unable to get a complete review of systems from patient  Vitals: Blood Pressure: 138/60 (11/25/19 0620)  Pulse: 67 (11/25/19 0620)  Temperature: (!) 97 3 °F (36 3 °C) (11/25/19 0620)  Temp Source: Temporal (11/25/19 5601)  Respirations: 18 (11/25/19 0620)  Height: 5' (152 4 cm)(Stated) (11/21/19 2143)  Weight - Scale: 77 9 kg (171 lb 11 8 oz)(Weigh bed) (11/25/19 0600)  SpO2: 93 % (11/25/19 0620)     Exam:   Physical Exam   Constitutional: No distress  Frail elderly female   HENT:   Head: Normocephalic and atraumatic  Eyes: Conjunctivae and EOM are normal    Neck: Normal range of motion  Neck supple  Cardiovascular: Normal rate and regular rhythm  Pulmonary/Chest: Effort normal  No respiratory distress  Abdominal: Soft  She exhibits no distension  There is no tenderness     Musculoskeletal:   Generalized weakness Neurological:   Patient is awake/alert following simple commands   Skin: Skin is warm and dry  Psychiatric: Her behavior is normal        Discussion with Family:  Case management/hospice team spoke with family regarding discharge planning    Discharge instructions/Information to patient and family:   See after visit summary for information provided to patient and family  Provisions for Follow-Up Care:  See after visit summary for information related to follow-up care and any pertinent home health orders  Disposition:     Other [facility] to [facility] SNF:   · Not Applicable to this Patient - Not Applicable to this Patient    Planned Readmission:    no     Discharge Statement:  I spent 35 minutes discharging the patient  This time was spent on the day of discharge  I had direct contact with the patient on the day of discharge  Greater than 50% of the total time was spent examining patient, answering all patient questions, arranging and discussing plan of care with patient as well as directly providing post-discharge instructions  Additional time then spent on discharge activities  Discharge Medications:  See after visit summary for reconciled discharge medications provided to patient and family        ** Please Note: This note has been constructed using a voice recognition system **

## 2020-01-13 LAB
BLOOD GROUP ANTIBODIES SERPL: NORMAL
BLOOD GROUP ANTIBODIES SERPL: NORMAL